# Patient Record
Sex: MALE | Race: WHITE | Employment: FULL TIME | URBAN - METROPOLITAN AREA
[De-identification: names, ages, dates, MRNs, and addresses within clinical notes are randomized per-mention and may not be internally consistent; named-entity substitution may affect disease eponyms.]

---

## 2017-03-20 ENCOUNTER — APPOINTMENT (OUTPATIENT)
Dept: GENERAL RADIOLOGY | Age: 47
End: 2017-03-20
Attending: EMERGENCY MEDICINE
Payer: COMMERCIAL

## 2017-03-20 ENCOUNTER — HOSPITAL ENCOUNTER (EMERGENCY)
Age: 47
Discharge: HOME | End: 2017-03-21
Attending: EMERGENCY MEDICINE
Payer: COMMERCIAL

## 2017-03-20 VITALS
TEMPERATURE: 98 F | WEIGHT: 194 LBS | DIASTOLIC BLOOD PRESSURE: 98 MMHG | HEART RATE: 88 BPM | OXYGEN SATURATION: 100 % | HEIGHT: 66 IN | SYSTOLIC BLOOD PRESSURE: 145 MMHG | RESPIRATION RATE: 20 BRPM | BODY MASS INDEX: 31.18 KG/M2

## 2017-03-20 DIAGNOSIS — J20.9 ACUTE BRONCHITIS, UNSPECIFIED ORGANISM: Primary | ICD-10-CM

## 2017-03-20 PROCEDURE — 94640 AIRWAY INHALATION TREATMENT: CPT

## 2017-03-20 PROCEDURE — 71020 XR CHEST PA + LAT CHEST (CPT=71020): CPT

## 2017-03-20 PROCEDURE — 99284 EMERGENCY DEPT VISIT MOD MDM: CPT

## 2017-03-20 RX ORDER — IPRATROPIUM BROMIDE AND ALBUTEROL SULFATE 2.5; .5 MG/3ML; MG/3ML
3 SOLUTION RESPIRATORY (INHALATION) ONCE
Status: COMPLETED | OUTPATIENT
Start: 2017-03-20 | End: 2017-03-20

## 2017-03-21 RX ORDER — AZITHROMYCIN 250 MG/1
TABLET, FILM COATED ORAL
Qty: 1 PACKAGE | Refills: 0 | Status: SHIPPED | OUTPATIENT
Start: 2017-03-21 | End: 2017-03-26

## 2017-03-21 RX ORDER — PROMETHAZINE HYDROCHLORIDE AND CODEINE PHOSPHATE 6.25; 1 MG/5ML; MG/5ML
5 SYRUP ORAL EVERY 4 HOURS PRN
Qty: 120 ML | Refills: 0 | Status: SHIPPED | OUTPATIENT
Start: 2017-03-21 | End: 2017-04-04 | Stop reason: ALTCHOICE

## 2017-03-21 NOTE — ED PROVIDER NOTES
Patient Seen in: [facility] In [place]    History   Patient presents with:  Cough/URI    Stated Complaint: COUGH FOR PAST 2 MONTHS WANTS TO R/O PNUEMO RIB PAIN FROM COUGHING    HPI    Patient presents with coughing off and on for the 03/20/17 2327 98 °F (36.7 °C)   Temp src 03/20/17 2327 Oral   SpO2 03/20/17 2327 100 %   O2 Device 03/20/17 2327 None (Room air)       Current:/98 mmHg  Pulse 88  Temp(Src) 98 °F (36.7 °C) (Oral)  Resp 20  Ht 167.6 cm (5' 6\")  Wt 87.998 kg  BMI 31. 3

## 2017-04-04 ENCOUNTER — OFFICE VISIT (OUTPATIENT)
Dept: FAMILY MEDICINE CLINIC | Facility: CLINIC | Age: 47
End: 2017-04-04

## 2017-04-04 ENCOUNTER — TELEPHONE (OUTPATIENT)
Dept: FAMILY MEDICINE CLINIC | Facility: CLINIC | Age: 47
End: 2017-04-04

## 2017-04-04 VITALS
TEMPERATURE: 98 F | WEIGHT: 200 LBS | RESPIRATION RATE: 18 BRPM | HEIGHT: 68 IN | SYSTOLIC BLOOD PRESSURE: 126 MMHG | BODY MASS INDEX: 30.31 KG/M2 | OXYGEN SATURATION: 100 % | DIASTOLIC BLOOD PRESSURE: 80 MMHG | HEART RATE: 96 BPM

## 2017-04-04 DIAGNOSIS — N52.8 OTHER MALE ERECTILE DYSFUNCTION: ICD-10-CM

## 2017-04-04 DIAGNOSIS — J40 BRONCHITIS: Primary | ICD-10-CM

## 2017-04-04 PROCEDURE — 99213 OFFICE O/P EST LOW 20 MIN: CPT | Performed by: FAMILY MEDICINE

## 2017-04-04 RX ORDER — VARDENAFIL HYDROCHLORIDE 10 MG/1
5 TABLET ORAL
Qty: 6 TABLET | Refills: 1 | Status: SHIPPED | OUTPATIENT
Start: 2017-04-04 | End: 2018-04-24

## 2017-04-04 NOTE — PROGRESS NOTES
Here in follow-up for bronchitis. He was seen in the urgent care on March 20. Missed work on March 21-23. Natural day office March 24. Was treated with Zithromax and cough suppressant.   I reviewed the office note as well as the chest x-ray with the drew erectile dysfunction, likely psychogenic secondary to new sex partner after 29 years of marriage    Plans FMLA paperwork completed for the days off work March 21-23. Trial of Viagra 50 mg 1/2-1 hour prior to desired sexual activity #6 with 1 refill.   Give

## 2017-04-04 NOTE — PATIENT INSTRUCTIONS
Take 1/2-1 hour prior to desired effect. An erection lasting more than 4 hours should send you to the ER. Sudden loss of vision or hearing should cause you to make an appointment here.   Unlikely a side effect of the medicine but a marker of a vascular di

## 2017-04-11 NOTE — TELEPHONE ENCOUNTER
PA for Levitra has been denied for the following: Must have ED caused by an organic cause. Organic causes are: Diabetes, vascular insufficiency, spinal injuries, or prostatectomy. No formulary alternatives listed. Please advise.

## 2017-04-24 ENCOUNTER — TELEPHONE (OUTPATIENT)
Dept: FAMILY MEDICINE CLINIC | Facility: CLINIC | Age: 47
End: 2017-04-24

## 2017-04-25 ENCOUNTER — TELEPHONE (OUTPATIENT)
Dept: FAMILY MEDICINE CLINIC | Facility: CLINIC | Age: 47
End: 2017-04-25

## 2018-04-01 ENCOUNTER — APPOINTMENT (OUTPATIENT)
Dept: GENERAL RADIOLOGY | Age: 48
End: 2018-04-01
Attending: EMERGENCY MEDICINE
Payer: COMMERCIAL

## 2018-04-01 ENCOUNTER — HOSPITAL ENCOUNTER (EMERGENCY)
Age: 48
Discharge: HOME | End: 2018-04-01
Attending: EMERGENCY MEDICINE
Payer: COMMERCIAL

## 2018-04-01 ENCOUNTER — APPOINTMENT (OUTPATIENT)
Dept: CT IMAGING | Age: 48
End: 2018-04-01
Attending: EMERGENCY MEDICINE
Payer: COMMERCIAL

## 2018-04-01 VITALS
SYSTOLIC BLOOD PRESSURE: 132 MMHG | HEART RATE: 78 BPM | HEIGHT: 67 IN | WEIGHT: 208 LBS | BODY MASS INDEX: 32.65 KG/M2 | TEMPERATURE: 98 F | RESPIRATION RATE: 14 BRPM | OXYGEN SATURATION: 98 % | DIASTOLIC BLOOD PRESSURE: 80 MMHG

## 2018-04-01 DIAGNOSIS — R07.9 CHEST PAIN OF UNCERTAIN ETIOLOGY: Primary | ICD-10-CM

## 2018-04-01 PROCEDURE — 99285 EMERGENCY DEPT VISIT HI MDM: CPT

## 2018-04-01 PROCEDURE — 85025 COMPLETE CBC W/AUTO DIFF WBC: CPT | Performed by: EMERGENCY MEDICINE

## 2018-04-01 PROCEDURE — 93010 ELECTROCARDIOGRAM REPORT: CPT

## 2018-04-01 PROCEDURE — 71275 CT ANGIOGRAPHY CHEST: CPT | Performed by: EMERGENCY MEDICINE

## 2018-04-01 PROCEDURE — 93005 ELECTROCARDIOGRAM TRACING: CPT

## 2018-04-01 PROCEDURE — 96374 THER/PROPH/DIAG INJ IV PUSH: CPT

## 2018-04-01 PROCEDURE — 80053 COMPREHEN METABOLIC PANEL: CPT | Performed by: EMERGENCY MEDICINE

## 2018-04-01 PROCEDURE — 71045 X-RAY EXAM CHEST 1 VIEW: CPT | Performed by: EMERGENCY MEDICINE

## 2018-04-01 PROCEDURE — 84484 ASSAY OF TROPONIN QUANT: CPT | Performed by: EMERGENCY MEDICINE

## 2018-04-01 RX ORDER — KETOROLAC TROMETHAMINE 30 MG/ML
30 INJECTION, SOLUTION INTRAMUSCULAR; INTRAVENOUS ONCE
Status: COMPLETED | OUTPATIENT
Start: 2018-04-01 | End: 2018-04-01

## 2018-04-01 RX ORDER — IBUPROFEN 600 MG/1
600 TABLET ORAL EVERY 8 HOURS PRN
Qty: 30 TABLET | Refills: 0 | Status: SHIPPED | OUTPATIENT
Start: 2018-04-01 | End: 2018-04-11

## 2018-04-01 NOTE — ED INITIAL ASSESSMENT (HPI)
PT REPORTS CENTER CHEST PAIN AT TIMES RADIATES TO LT CHEST SINCE YESTERDAY 1230 PT STATES HAS BEEN CONSTANT SINCE.

## 2018-04-01 NOTE — ED PROVIDER NOTES
Patient Seen in: [facility] In [place]    History   Patient presents with:  Chest Pain Angina (cardiovascular)    Stated Complaint: chest pain since yesterday    HPI    60-year-old male presents emergency department has had some chest carotid bruit  CV: Regular rate and rhythm no murmur rub  Respiratory: Clear to auscultation bilaterally no crackles no wheezes no accessory muscle use  Abdomen: Soft nontender nondistended, no rebound no guarding  no hepatosplenomegaly bowel sounds are pr abnormality identified. Patient's history and emergency room evaluation does not suggest cardiac etiology of his chest discomfort. Supportive care instructions outlined. Discharge with close outpatient follow-up was recommended.   Patient should continue Refills: 0

## 2018-04-02 ENCOUNTER — TELEPHONE (OUTPATIENT)
Dept: FAMILY MEDICINE CLINIC | Facility: CLINIC | Age: 48
End: 2018-04-02

## 2018-04-02 NOTE — TELEPHONE ENCOUNTER
Called patient regarding ER visit for chest pain.   LVM for patient to call us to schedule follow up with

## 2018-04-24 ENCOUNTER — OFFICE VISIT (OUTPATIENT)
Dept: FAMILY MEDICINE CLINIC | Facility: CLINIC | Age: 48
End: 2018-04-24

## 2018-04-24 ENCOUNTER — LAB ENCOUNTER (OUTPATIENT)
Dept: LAB | Age: 48
End: 2018-04-24
Attending: FAMILY MEDICINE
Payer: COMMERCIAL

## 2018-04-24 VITALS
OXYGEN SATURATION: 98 % | DIASTOLIC BLOOD PRESSURE: 98 MMHG | RESPIRATION RATE: 18 BRPM | HEART RATE: 81 BPM | HEIGHT: 68 IN | SYSTOLIC BLOOD PRESSURE: 124 MMHG | BODY MASS INDEX: 32.74 KG/M2 | WEIGHT: 216 LBS

## 2018-04-24 DIAGNOSIS — Z00.00 ROUTINE GENERAL MEDICAL EXAMINATION AT A HEALTH CARE FACILITY: Primary | ICD-10-CM

## 2018-04-24 DIAGNOSIS — Z00.00 BLOOD TESTS FOR ROUTINE GENERAL PHYSICAL EXAMINATION: ICD-10-CM

## 2018-04-24 DIAGNOSIS — N52.9 ERECTILE DYSFUNCTION, UNSPECIFIED ERECTILE DYSFUNCTION TYPE: ICD-10-CM

## 2018-04-24 DIAGNOSIS — N46.9 INFERTILITY MALE: ICD-10-CM

## 2018-04-24 DIAGNOSIS — R74.8 ELEVATED LIVER ENZYMES: ICD-10-CM

## 2018-04-24 DIAGNOSIS — R73.01 IMPAIRED FASTING GLUCOSE: ICD-10-CM

## 2018-04-24 PROCEDURE — 84443 ASSAY THYROID STIM HORMONE: CPT

## 2018-04-24 PROCEDURE — 80061 LIPID PANEL: CPT

## 2018-04-24 PROCEDURE — 80053 COMPREHEN METABOLIC PANEL: CPT

## 2018-04-24 PROCEDURE — 99396 PREV VISIT EST AGE 40-64: CPT | Performed by: FAMILY MEDICINE

## 2018-04-24 PROCEDURE — 36415 COLL VENOUS BLD VENIPUNCTURE: CPT

## 2018-04-24 PROCEDURE — 84403 ASSAY OF TOTAL TESTOSTERONE: CPT

## 2018-04-24 NOTE — PROGRESS NOTES
HPI:  Here for a physical.    PAST MEDICAL HISTORY:  Past Medical History:   Diagnosis Date   • Plantar fasciitis 2/6/2013     PAST SURGICAL HISTORY:  Past Surgical History:  No date: APPENDECTOMY  No date: OTHER SURGICAL HISTORY  MEDICATIONS:    No curren would not cover med due to not being tested for thyroid disease  MUSCULOSKELETAL: knee pain, had scope of right, still pain on left  NEURO: No complaints of any new headaches or change in headache pattern.   PSYCHE: No complaints of symptoms of depression o areas.  EXTREMITIES / MUSCULOSKELETAL: 4 extremities with grossly normal ROM. GIngerly moves due to left knee pain,   No cyanosis, clubbing or edema. NEUROLOGIC: CN's II-XII grossly intact, DTR's 2+/4+ throughout. Strength 5+/5+ x 4 ext.  Alert and Lorriane Hail

## 2018-11-15 ENCOUNTER — TELEPHONE (OUTPATIENT)
Dept: FAMILY MEDICINE CLINIC | Facility: CLINIC | Age: 48
End: 2018-11-15

## 2018-11-15 NOTE — TELEPHONE ENCOUNTER
Renetta, spoke to pt regarding adding herrera Strong to his hmo ins, pt verbalized understanding and he will call the insurance to add his pcp herrera Strong.

## 2018-12-21 ENCOUNTER — HOSPITAL ENCOUNTER (OUTPATIENT)
Age: 48
Discharge: HOME | End: 2018-12-21
Payer: COMMERCIAL

## 2018-12-21 ENCOUNTER — APPOINTMENT (OUTPATIENT)
Dept: ULTRASOUND IMAGING | Age: 48
End: 2018-12-21
Attending: PHYSICIAN ASSISTANT
Payer: COMMERCIAL

## 2018-12-21 VITALS
OXYGEN SATURATION: 96 % | RESPIRATION RATE: 18 BRPM | TEMPERATURE: 99 F | HEART RATE: 99 BPM | SYSTOLIC BLOOD PRESSURE: 141 MMHG | DIASTOLIC BLOOD PRESSURE: 89 MMHG

## 2018-12-21 DIAGNOSIS — N50.82 SCROTAL PAIN: Primary | ICD-10-CM

## 2018-12-21 PROCEDURE — 81002 URINALYSIS NONAUTO W/O SCOPE: CPT | Performed by: PHYSICIAN ASSISTANT

## 2018-12-21 PROCEDURE — 87591 N.GONORRHOEAE DNA AMP PROB: CPT | Performed by: PHYSICIAN ASSISTANT

## 2018-12-21 PROCEDURE — 99214 OFFICE O/P EST MOD 30 MIN: CPT

## 2018-12-21 PROCEDURE — 93975 VASCULAR STUDY: CPT | Performed by: PHYSICIAN ASSISTANT

## 2018-12-21 PROCEDURE — 87491 CHLMYD TRACH DNA AMP PROBE: CPT | Performed by: PHYSICIAN ASSISTANT

## 2018-12-21 PROCEDURE — 76870 US EXAM SCROTUM: CPT | Performed by: PHYSICIAN ASSISTANT

## 2018-12-21 NOTE — ED PROVIDER NOTES
Patient Seen in: [facility]    History   No chief complaint on file.     Stated Complaint: testicle pain    HPI    CHIEF COMPLAINT: Testicular pain     HISTORY OF PRESENT ILLNESS: Patient is a 77-year-old male who presents with c status: Never Smoker      Smokeless tobacco: Never Used      Tobacco comment: two per month    Alcohol use:  Yes      Alcohol/week: 0.0 oz    Drug use: No      Review of Systems    Positive for stated complaint: testicle pain  Other systems are as noted in HISTORY: (As transcribed by Technologist)  Patient states he felt a lump this morning in right testicle. FINDINGS:  TESTES:  Normal appearance with normal arterial and venous flow. No mass or abnormal echogenicity.  EPIDIDYMIS:  4 mm right and 3mm left

## 2018-12-21 NOTE — ED INITIAL ASSESSMENT (HPI)
Right testicle - pain    While taking a shower. Denies any urinary problems. Swelling or redness. Pt states he had similar pain about 2 yrs ago on and off but would go away. pt concerned  Because lump feels hard.

## 2019-05-01 ENCOUNTER — OFFICE VISIT (OUTPATIENT)
Dept: FAMILY MEDICINE CLINIC | Facility: CLINIC | Age: 49
End: 2019-05-01

## 2019-05-01 VITALS
BODY MASS INDEX: 34.44 KG/M2 | HEIGHT: 67.5 IN | RESPIRATION RATE: 18 BRPM | WEIGHT: 222 LBS | SYSTOLIC BLOOD PRESSURE: 138 MMHG | HEART RATE: 94 BPM | OXYGEN SATURATION: 98 % | DIASTOLIC BLOOD PRESSURE: 78 MMHG

## 2019-05-01 DIAGNOSIS — M25.512 ARTHRALGIA OF SHOULDER REGION, LEFT: Primary | ICD-10-CM

## 2019-05-01 PROCEDURE — 99213 OFFICE O/P EST LOW 20 MIN: CPT | Performed by: FAMILY MEDICINE

## 2019-05-01 RX ORDER — NAPROXEN 500 MG/1
500 TABLET ORAL 2 TIMES DAILY
Qty: 28 TABLET | Refills: 0 | Status: SHIPPED | OUTPATIENT
Start: 2019-05-01 | End: 2019-06-12

## 2019-05-01 NOTE — PROGRESS NOTES
Here with several months of left shoulder pain anterior. Limiting his range of motion and affecting his activities of daily living such as showering and getting dressed. No specific injury. No neck pain. No radiation to the neck.   It does radiate into pain.  Wrist good range of motion without pain. Fingers good range of motion 5 out of 5 strength. Neurologic exam reveals positive reflexes and positive light touch.     Assessment left shoulder arthralgia, I suspect osteoarthritis with possibility of a s

## 2019-05-03 ENCOUNTER — HOSPITAL ENCOUNTER (OUTPATIENT)
Dept: GENERAL RADIOLOGY | Age: 49
Discharge: HOME | End: 2019-05-03
Attending: FAMILY MEDICINE
Payer: COMMERCIAL

## 2019-05-03 DIAGNOSIS — M25.512 ARTHRALGIA OF SHOULDER REGION, LEFT: ICD-10-CM

## 2019-05-03 PROCEDURE — 73030 X-RAY EXAM OF SHOULDER: CPT | Performed by: FAMILY MEDICINE

## 2019-06-12 ENCOUNTER — OFFICE VISIT (OUTPATIENT)
Dept: FAMILY MEDICINE CLINIC | Facility: CLINIC | Age: 49
End: 2019-06-12

## 2019-06-12 VITALS
SYSTOLIC BLOOD PRESSURE: 120 MMHG | HEIGHT: 67.5 IN | BODY MASS INDEX: 33.04 KG/M2 | HEART RATE: 84 BPM | OXYGEN SATURATION: 95 % | DIASTOLIC BLOOD PRESSURE: 88 MMHG | RESPIRATION RATE: 18 BRPM | TEMPERATURE: 98 F | WEIGHT: 213 LBS

## 2019-06-12 DIAGNOSIS — H57.9 SENSATION OF FOREIGN BODY IN EYE: ICD-10-CM

## 2019-06-12 DIAGNOSIS — H10.31 ACUTE BACTERIAL CONJUNCTIVITIS OF RIGHT EYE: Primary | ICD-10-CM

## 2019-06-12 PROCEDURE — 99213 OFFICE O/P EST LOW 20 MIN: CPT | Performed by: PHYSICIAN ASSISTANT

## 2019-06-12 RX ORDER — OFLOXACIN 3 MG/ML
SOLUTION/ DROPS OPHTHALMIC
Qty: 1 BOTTLE | Refills: 0 | Status: SHIPPED | OUTPATIENT
Start: 2019-06-12 | End: 2019-12-04

## 2019-06-12 NOTE — PROGRESS NOTES
CHIEF COMPLAINT:   Patient presents with:  Eye Problem: right eye drainage,dryness mild tearing,redness s/s for 3 days.   No OTC meds used      HPI:   Maximilianotyesha Blackman. is a 50year old male who presents with chief complaint of right eye redness and pain f EYES:denies blurred vision or double vision.  See HPI  HENT: denies ear pain, congestion, sore throat  LUNGS: denies shortness of breath or cough  CARDIOVASCULAR: denies chest pain or palpitations   GI: denies N/V/C or abdominal pain  NEURO: denies headache Advised patient to avoid touching eyes. Stressed importance of good handwashing as conjunctivitis is very contagious. Warm compresses to affected eye prn. Can return to work/school after on medication for 24 hours. Patient Instructions     1.  Ofloxac · Failure of normal vision to return within 24 to 48 hours  Date Last Reviewed: 7/1/2017  © 0396-9533 [employer]. [address] [facility], [address] [place] [address]. All rights reserved.  This information is not intended as a substitute for profession

## 2019-06-12 NOTE — PATIENT INSTRUCTIONS
1. Ofloxacin  2. Follow up with ophthalmology ASAP  3. If worsening symptoms to [facility] or ER  4. Stop contact use until symptoms resolve    Conjunctivitis, Nonspecific    The membrane that covers the white part of your eye (the conjunctiva) is inflamed.  Inf © 9838-8909 [employer]. [address] [facility] [place], [facility] [employer] [place]. All rights reserved. This information is not intended as a substitute for professional medical care. Always follow your healthcare professional's instructions.

## 2019-12-04 ENCOUNTER — OFFICE VISIT (OUTPATIENT)
Dept: FAMILY MEDICINE CLINIC | Facility: CLINIC | Age: 49
End: 2019-12-04

## 2019-12-04 VITALS
WEIGHT: 229 LBS | SYSTOLIC BLOOD PRESSURE: 130 MMHG | HEIGHT: 67.5 IN | TEMPERATURE: 99 F | DIASTOLIC BLOOD PRESSURE: 88 MMHG | RESPIRATION RATE: 18 BRPM | BODY MASS INDEX: 35.52 KG/M2 | HEART RATE: 93 BPM | OXYGEN SATURATION: 98 %

## 2019-12-04 DIAGNOSIS — J06.9 VIRAL URI WITH COUGH: Primary | ICD-10-CM

## 2019-12-04 PROCEDURE — 99213 OFFICE O/P EST LOW 20 MIN: CPT | Performed by: NURSE PRACTITIONER

## 2019-12-04 RX ORDER — ALBUTEROL SULFATE 90 UG/1
2 AEROSOL, METERED RESPIRATORY (INHALATION) EVERY 4 HOURS PRN
Qty: 1 INHALER | Refills: 0 | Status: SHIPPED | OUTPATIENT
Start: 2019-12-04 | End: 2019-12-18

## 2019-12-04 RX ORDER — BENZONATATE 200 MG/1
200 CAPSULE ORAL 3 TIMES DAILY PRN
Qty: 20 CAPSULE | Refills: 0 | Status: SHIPPED | OUTPATIENT
Start: 2019-12-04 | End: 2019-12-11

## 2019-12-04 NOTE — PROGRESS NOTES
CHIEF COMPLAINT:   Patient presents with:  Cough: dry, with chest discomfort  Headache: s/s for 3 days. OTC meds taken      HPI:   Annalisa Muniz. is a 52year old male who presents for upper respiratory symptoms for  3 days.  Patient reports congestion THROAT: Oral mucosa pink, moist. Posterior pharynx is not erythematous. no exudates. Tonsils 2/4. NECK: Supple, non-tender  LUNGS: clear to auscultation bilaterally, no wheezes or rhonchi. Breathing is non labored.   CARDIO: RRR without murmur  EXTREMITI You have a viral upper respiratory illness (URI), which is another term for the common cold. This illness is contagious during the first few days. It is spread through the air by coughing and sneezing.  It may also be spread by direct contact (touching the · Over-the-counter cold medicines will not shorten the length of time you’re sick, but they may be helpful for the following symptoms: cough, sore throat, and nasal and sinus congestion.  If you take prescription medicines, ask your healthcare provider or p 5. Place the inhaler in your mouth and close your lips tightly around it. (Or hold the inhaler 1 to 2 inches from your open mouth if told to do so by your healthcare provider.)  6. Squeeze the inhaler as you breathe in slowly through your mouth.  Do this un A steroid inhaler is used to prevent an asthma attack. Don't use this to treat an acute wheezing episode. Use only a bronchodilator inhaler (quick relief) to treat an acute asthma attack.   If your medicine is not working and you need to use it more often t

## 2019-12-04 NOTE — PATIENT INSTRUCTIONS
Humidifier in room  Sleep propped  Push fluids  Limit dairy  Mucinex as directed  Benadryl at night as needed    Viral Upper Respiratory Illness (Adult)    You have a viral upper respiratory illness (URI), which is another term for the common cold.  This · Over-the-counter cold medicines will not shorten the length of time you’re sick, but they may be helpful for the following symptoms: cough, sore throat, and nasal and sinus congestion.  If you take prescription medicines, ask your healthcare provider or p 5. Place the inhaler in your mouth and close your lips tightly around it. (Or hold the inhaler 1 to 2 inches from your open mouth if told to do so by your healthcare provider.)  6. Squeeze the inhaler as you breathe in slowly through your mouth.  Do this un A steroid inhaler is used to prevent an asthma attack. Don't use this to treat an acute wheezing episode. Use only a bronchodilator inhaler (quick relief) to treat an acute asthma attack.   If your medicine is not working and you need to use it more often t

## 2020-11-23 ENCOUNTER — OFFICE VISIT (OUTPATIENT)
Dept: FAMILY MEDICINE CLINIC | Facility: CLINIC | Age: 50
End: 2020-11-23

## 2020-11-23 VITALS
HEART RATE: 91 BPM | OXYGEN SATURATION: 98 % | RESPIRATION RATE: 18 BRPM | DIASTOLIC BLOOD PRESSURE: 78 MMHG | HEIGHT: 67.5 IN | BODY MASS INDEX: 34.44 KG/M2 | SYSTOLIC BLOOD PRESSURE: 118 MMHG | WEIGHT: 222 LBS

## 2020-11-23 DIAGNOSIS — L72.3 SEBACEOUS CYST: Primary | ICD-10-CM

## 2020-11-23 PROCEDURE — 99213 OFFICE O/P EST LOW 20 MIN: CPT | Performed by: FAMILY MEDICINE

## 2020-11-23 PROCEDURE — 3078F DIAST BP <80 MM HG: CPT | Performed by: FAMILY MEDICINE

## 2020-11-23 PROCEDURE — 3008F BODY MASS INDEX DOCD: CPT | Performed by: FAMILY MEDICINE

## 2020-11-23 PROCEDURE — 3074F SYST BP LT 130 MM HG: CPT | Performed by: FAMILY MEDICINE

## 2020-11-23 NOTE — PROGRESS NOTES
Lesion present to the left axilla going on 6 months. Just happened and noticed it. No itching or bleeding. No pain or discharge. Seems of gotten slightly bigger over the 6 months. No fevers chills or night sweats.   He has tingling down the arm but thi

## 2020-11-23 NOTE — PATIENT INSTRUCTIONS
Epidermoid Cyst, No Infection  An epidermoid cyst is a small abnormal growth in the top layers of the skin. It's filled with keratin, the same proteins that make up your hair and nails.  An epidermoid cyst may incorrectly be called a sebaceous cyst.   Roly Epidermoid cysts often go away without any treatment. If your cyst doesn’t go away, and it bothers you, it may be drained or removed. If the cyst drains on its own, it may return. Resist the temptation to squeeze, pop, stick a needle in it, or cut it open.

## 2020-11-30 ENCOUNTER — APPOINTMENT (OUTPATIENT)
Dept: LAB | Facility: HOSPITAL | Age: 50
End: 2020-11-30
Attending: FAMILY MEDICINE
Payer: COMMERCIAL

## 2020-11-30 DIAGNOSIS — Z20.822 ENCOUNTER FOR SCREENING LABORATORY TESTING FOR COVID-19 VIRUS: ICD-10-CM

## 2021-02-27 ENCOUNTER — APPOINTMENT (OUTPATIENT)
Dept: CT IMAGING | Age: 51
End: 2021-02-27
Attending: FAMILY MEDICINE

## 2021-02-27 ENCOUNTER — HOSPITAL ENCOUNTER (EMERGENCY)
Age: 51
Discharge: HOME | End: 2021-02-27
Attending: FAMILY MEDICINE

## 2021-02-27 VITALS
SYSTOLIC BLOOD PRESSURE: 156 MMHG | RESPIRATION RATE: 18 BRPM | DIASTOLIC BLOOD PRESSURE: 107 MMHG | HEART RATE: 105 BPM | TEMPERATURE: 98.1 F | OXYGEN SATURATION: 97 %
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
## 2021-02-27 DIAGNOSIS — M54.50 ACUTE BILATERAL LOW BACK PAIN WITHOUT SCIATICA: Primary | ICD-10-CM

## 2021-02-27 PROCEDURE — 99284 EMERGENCY DEPT VISIT MOD MDM: CPT

## 2021-02-27 PROCEDURE — 72131 CT LUMBAR SPINE W/O DYE: CPT

## 2021-02-27 PROCEDURE — 99283 EMERGENCY DEPT VISIT LOW MDM: CPT | Performed by: FAMILY MEDICINE

## 2021-02-27 PROCEDURE — 70450 CT HEAD/BRAIN W/O DYE: CPT

## 2021-02-27 PROCEDURE — 10002803 HB RX 637: Performed by: FAMILY MEDICINE

## 2021-02-27 RX ORDER — BACLOFEN 5 MG/1
5 TABLET ORAL 3 TIMES DAILY
Qty: 15 TABLET | Refills: 0 | Status: SHIPPED | OUTPATIENT
Start: 2021-02-27 | End: 2021-03-04

## 2021-02-27 RX ORDER — ACETAMINOPHEN 500 MG
1000 TABLET ORAL ONCE
Status: DISCONTINUED | OUTPATIENT
Start: 2021-02-27 | End: 2021-02-27

## 2021-02-27 RX ORDER — TRAMADOL HYDROCHLORIDE 50 MG/1
50 TABLET ORAL EVERY 6 HOURS PRN
Qty: 20 TABLET | Refills: 0 | Status: SHIPPED | OUTPATIENT
Start: 2021-02-27 | End: 2021-03-04

## 2021-02-27 RX ORDER — NAPROXEN 500 MG/1
500 TABLET ORAL 2 TIMES DAILY WITH MEALS
Qty: 14 TABLET | Refills: 0 | Status: SHIPPED | OUTPATIENT
Start: 2021-02-27 | End: 2021-03-06

## 2021-02-27 RX ORDER — HYDROCODONE BITARTRATE AND ACETAMINOPHEN 5; 325 MG/1; MG/1
1 TABLET ORAL ONCE
Status: COMPLETED | OUTPATIENT
Start: 2021-02-27 | End: 2021-02-27

## 2021-02-27 RX ADMIN — HYDROCODONE BITARTRATE AND ACETAMINOPHEN 1 TABLET: 5; 325 TABLET ORAL at 23:00

## 2021-02-27 ASSESSMENT — ENCOUNTER SYMPTOMS
TROUBLE SWALLOWING: 0
DIAPHORESIS: 0
ALLERGIC/IMMUNOLOGIC NEGATIVE: 1
EYES NEGATIVE: 1
VOMITING: 0
WEAKNESS: 0
PSYCHIATRIC NEGATIVE: 1
PHOTOPHOBIA: 0
NAUSEA: 0
BACK PAIN: 1
CHEST TIGHTNESS: 0
ABDOMINAL PAIN: 0
STRIDOR: 0
CONFUSION: 0
HEADACHES: 0
RESPIRATORY NEGATIVE: 1
VOICE CHANGE: 0
NEUROLOGICAL NEGATIVE: 1
HEMATOLOGIC/LYMPHATIC NEGATIVE: 1
ENDOCRINE NEGATIVE: 1
HALLUCINATIONS: 0
SPEECH DIFFICULTY: 0
CONSTITUTIONAL NEGATIVE: 1
DIZZINESS: 0
WHEEZING: 0
LIGHT-HEADEDNESS: 0
APNEA: 0
FEVER: 0
SHORTNESS OF BREATH: 0
GASTROINTESTINAL NEGATIVE: 1

## 2021-02-27 ASSESSMENT — PAIN SCALES - GENERAL
PAINLEVEL_OUTOF10: 7
PAINLEVEL_OUTOF10: 5

## 2021-03-02 ENCOUNTER — OFFICE VISIT (OUTPATIENT)
Dept: FAMILY MEDICINE CLINIC | Facility: CLINIC | Age: 51
End: 2021-03-02

## 2021-03-02 VITALS
SYSTOLIC BLOOD PRESSURE: 138 MMHG | OXYGEN SATURATION: 98 % | RESPIRATION RATE: 18 BRPM | HEART RATE: 82 BPM | BODY MASS INDEX: 35.72 KG/M2 | WEIGHT: 217 LBS | DIASTOLIC BLOOD PRESSURE: 100 MMHG | HEIGHT: 65.5 IN

## 2021-03-02 DIAGNOSIS — S49.91XA TRAUMATIC INJURY OF SHOULDER, RIGHT, INITIAL ENCOUNTER: ICD-10-CM

## 2021-03-02 DIAGNOSIS — S30.0XXA CONTUSION OF BUTTOCK, INITIAL ENCOUNTER: ICD-10-CM

## 2021-03-02 DIAGNOSIS — G44.209 ACUTE NON INTRACTABLE TENSION-TYPE HEADACHE: Primary | ICD-10-CM

## 2021-03-02 DIAGNOSIS — M54.50 ACUTE MIDLINE LOW BACK PAIN WITHOUT SCIATICA: ICD-10-CM

## 2021-03-02 DIAGNOSIS — R20.0 NUMBNESS OF FINGERS: ICD-10-CM

## 2021-03-02 DIAGNOSIS — M51.26 LUMBAR HERNIATED DISC: ICD-10-CM

## 2021-03-02 DIAGNOSIS — S80.12XA CONTUSION OF LEFT CALF, INITIAL ENCOUNTER: ICD-10-CM

## 2021-03-02 DIAGNOSIS — S80.211A ABRASION, KNEE, RIGHT, INITIAL ENCOUNTER: ICD-10-CM

## 2021-03-02 PROCEDURE — 3075F SYST BP GE 130 - 139MM HG: CPT | Performed by: FAMILY MEDICINE

## 2021-03-02 PROCEDURE — 3080F DIAST BP >= 90 MM HG: CPT | Performed by: FAMILY MEDICINE

## 2021-03-02 PROCEDURE — 3008F BODY MASS INDEX DOCD: CPT | Performed by: FAMILY MEDICINE

## 2021-03-02 PROCEDURE — 99214 OFFICE O/P EST MOD 30 MIN: CPT | Performed by: FAMILY MEDICINE

## 2021-03-02 RX ORDER — TRAMADOL HYDROCHLORIDE 50 MG/1
50 TABLET ORAL
COMMUNITY
Start: 2021-02-27 | End: 2021-03-04

## 2021-03-02 RX ORDER — NAPROXEN 500 MG/1
TABLET ORAL
COMMUNITY
Start: 2021-02-27 | End: 2021-05-14

## 2021-03-02 RX ORDER — SILDENAFIL CITRATE 20 MG/1
TABLET ORAL
COMMUNITY
Start: 2020-12-11
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
## 2021-03-03 ENCOUNTER — HOSPITAL ENCOUNTER (OUTPATIENT)
Dept: GENERAL RADIOLOGY | Facility: HOSPITAL | Age: 51
Discharge: HOME | End: 2021-03-03
Attending: FAMILY MEDICINE
Payer: COMMERCIAL

## 2021-03-03 ENCOUNTER — HOSPITAL ENCOUNTER (OUTPATIENT)
Dept: MRI IMAGING | Facility: HOSPITAL | Age: 51
Discharge: HOME | End: 2021-03-03
Attending: FAMILY MEDICINE
Payer: COMMERCIAL

## 2021-03-03 DIAGNOSIS — R20.0 NUMBNESS OF FINGERS: ICD-10-CM

## 2021-03-03 DIAGNOSIS — Z13.89 ENCOUNTER FOR IMAGING TO SCREEN FOR METAL PRIOR TO MRI: ICD-10-CM

## 2021-03-03 DIAGNOSIS — G44.209 ACUTE NON INTRACTABLE TENSION-TYPE HEADACHE: ICD-10-CM

## 2021-03-03 PROCEDURE — 70030 X-RAY EYE FOR FOREIGN BODY: CPT | Performed by: FAMILY MEDICINE

## 2021-03-03 PROCEDURE — 72141 MRI NECK SPINE W/O DYE: CPT | Performed by: FAMILY MEDICINE

## 2021-03-04 PROBLEM — M50.20 HERNIATED DISC, CERVICAL: Status: ACTIVE | Noted: 2021-03-04

## 2021-03-09 ENCOUNTER — OFFICE VISIT (OUTPATIENT)
Dept: FAMILY MEDICINE CLINIC | Facility: CLINIC | Age: 51
End: 2021-03-09

## 2021-03-09 VITALS
DIASTOLIC BLOOD PRESSURE: 90 MMHG | SYSTOLIC BLOOD PRESSURE: 140 MMHG | RESPIRATION RATE: 18 BRPM | HEART RATE: 98 BPM | OXYGEN SATURATION: 98 %

## 2021-03-09 DIAGNOSIS — M50.20 HERNIATED DISC, CERVICAL: ICD-10-CM

## 2021-03-09 DIAGNOSIS — M54.50 ACUTE MIDLINE LOW BACK PAIN WITHOUT SCIATICA: ICD-10-CM

## 2021-03-09 DIAGNOSIS — G44.209 ACUTE NON INTRACTABLE TENSION-TYPE HEADACHE: Primary | ICD-10-CM

## 2021-03-09 PROCEDURE — 3077F SYST BP >= 140 MM HG: CPT | Performed by: FAMILY MEDICINE

## 2021-03-09 PROCEDURE — 99214 OFFICE O/P EST MOD 30 MIN: CPT | Performed by: FAMILY MEDICINE

## 2021-03-09 PROCEDURE — 3080F DIAST BP >= 90 MM HG: CPT | Performed by: FAMILY MEDICINE

## 2021-03-09 RX ORDER — TRAMADOL HYDROCHLORIDE 50 MG/1
50 TABLET ORAL EVERY 6 HOURS PRN
Qty: 30 TABLET | Refills: 1 | Status: SHIPPED | OUTPATIENT
Start: 2021-03-09 | End: 2021-05-14

## 2021-03-09 RX ORDER — BACLOFEN 5 MG/1
5 TABLET ORAL 3 TIMES DAILY
Qty: 90 TABLET | Refills: 0 | Status: SHIPPED | OUTPATIENT
Start: 2021-03-09 | End: 2021-04-08
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
## 2021-03-10 ENCOUNTER — PATIENT MESSAGE (OUTPATIENT)
Dept: FAMILY MEDICINE CLINIC | Facility: CLINIC | Age: 51
End: 2021-03-10
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
## 2021-03-12 ENCOUNTER — TELEPHONE (OUTPATIENT)
Dept: FAMILY MEDICINE CLINIC | Facility: CLINIC | Age: 51
End: 2021-03-12

## 2021-03-12 DIAGNOSIS — M54.50 ACUTE BILATERAL LOW BACK PAIN, UNSPECIFIED WHETHER SCIATICA PRESENT: Primary | ICD-10-CM
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
## 2021-03-12 NOTE — TELEPHONE ENCOUNTER
[address] [address] [address] Clinical Staff  Cc: [facility] Referral Pool  Phone Number: [phone]   . Reason for the order/referral:Orthopedic surgeon/CONSULT AND TREATMENT   PCP: Loly Dunham   Refer to Provider Katiana Littlejohn   Specialty:Orthopedic s

## 2021-03-18 ENCOUNTER — PATIENT MESSAGE (OUTPATIENT)
Dept: FAMILY MEDICINE CLINIC | Facility: CLINIC | Age: 51
End: 2021-03-18

## 2021-03-18 NOTE — TELEPHONE ENCOUNTER
From: Mateo Carl. To: Colletta Silos, MD  Sent: 3/18/2021 11:39 AM CDT  Subject: Non-Urgent Medical Question    Dr. Yunior Guerra, I am feeling 80% better and I would like to be released back to work fully without restrictions.  I will be contacting my Adalgisa Olguin

## 2021-07-13 ENCOUNTER — OFFICE VISIT (OUTPATIENT)
Dept: FAMILY MEDICINE CLINIC | Facility: CLINIC | Age: 51
End: 2021-07-13

## 2021-07-13 DIAGNOSIS — M54.2 CERVICALGIA: ICD-10-CM

## 2021-07-13 DIAGNOSIS — M48.02 NEURAL FORAMINAL STENOSIS OF CERVICAL SPINE: Primary | ICD-10-CM

## 2021-07-13 DIAGNOSIS — G89.29 CHRONIC BILATERAL THORACIC BACK PAIN: ICD-10-CM

## 2021-07-13 DIAGNOSIS — M54.6 CHRONIC BILATERAL THORACIC BACK PAIN: ICD-10-CM

## 2021-07-13 PROCEDURE — 99213 OFFICE O/P EST LOW 20 MIN: CPT | Performed by: INTERNAL MEDICINE

## 2021-07-13 PROCEDURE — 3079F DIAST BP 80-89 MM HG: CPT | Performed by: INTERNAL MEDICINE

## 2021-07-13 PROCEDURE — 3008F BODY MASS INDEX DOCD: CPT | Performed by: INTERNAL MEDICINE

## 2021-07-13 PROCEDURE — 3075F SYST BP GE 130 - 139MM HG: CPT | Performed by: INTERNAL MEDICINE

## 2021-07-13 RX ORDER — BICTEGRAVIR SODIUM, EMTRICITABINE, AND TENOFOVIR ALAFENAMIDE FUMARATE 50; 200; 25 MG/1; MG/1; MG/1
TABLET ORAL
COMMUNITY
Start: 2021-05-14 | End: 2021-07-13

## 2021-07-13 RX ORDER — BACLOFEN 5 MG/1
5 TABLET ORAL
COMMUNITY
Start: 2021-02-27 | End: 2021-07-13
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
## 2021-07-21 ENCOUNTER — TELEPHONE (OUTPATIENT)
Dept: FAMILY MEDICINE CLINIC | Facility: CLINIC | Age: 51
End: 2021-07-21

## 2021-07-21 VITALS
SYSTOLIC BLOOD PRESSURE: 138 MMHG | BODY MASS INDEX: 35.16 KG/M2 | HEART RATE: 102 BPM | TEMPERATURE: 98 F | OXYGEN SATURATION: 98 % | RESPIRATION RATE: 20 BRPM | DIASTOLIC BLOOD PRESSURE: 88 MMHG | HEIGHT: 67 IN | WEIGHT: 224 LBS

## 2021-07-21 DIAGNOSIS — M54.2 CERVICALGIA: Primary | ICD-10-CM

## 2021-07-21 DIAGNOSIS — M54.6 THORACIC SPINE PAIN: ICD-10-CM

## 2021-07-22 NOTE — TELEPHONE ENCOUNTER
Please place a referral for follow up with ortho Dr. Flower Paulino. Let pt know his referral is ready.   Cervicalgia, thoracic pain

## 2021-08-11 PROBLEM — M48.02 FORAMINAL STENOSIS OF CERVICAL REGION: Status: ACTIVE | Noted: 2021-08-11

## 2023-02-24 ENCOUNTER — HOSPITAL ENCOUNTER (OUTPATIENT)
Age: 53
Discharge: HOME | End: 2023-02-24
Payer: COMMERCIAL

## 2023-02-24 VITALS
RESPIRATION RATE: 18 BRPM | SYSTOLIC BLOOD PRESSURE: 142 MMHG | HEART RATE: 100 BPM | OXYGEN SATURATION: 95 % | DIASTOLIC BLOOD PRESSURE: 98 MMHG | TEMPERATURE: 100 F

## 2023-02-24 DIAGNOSIS — U07.1 COVID-19: Primary | ICD-10-CM

## 2023-02-24 PROCEDURE — 99203 OFFICE O/P NEW LOW 30 MIN: CPT

## 2023-02-24 PROCEDURE — 99213 OFFICE O/P EST LOW 20 MIN: CPT

## 2023-02-24 RX ORDER — NIRMATRELVIR AND RITONAVIR 300-100 MG
KIT ORAL
Qty: 30 TABLET | Refills: 0 | Status: SHIPPED | OUTPATIENT
Start: 2023-02-24 | End: 2023-03-01

## 2023-02-24 NOTE — DISCHARGE INSTRUCTIONS
You tested positive for COVID-19 today. You should remain in self isolation for 5 days from symptom onset, or until you are fever free for 24 hours, whichever is longer. Wear a well fitting mask for 5 additional days after your initial self-isolation, when you are in close contact with other people. Treat your symptoms as needed with over-the-counter medication such as cold medicine, ibuprofen, or Tylenol. If you develop chest pain, shortness or breath or believe your symptoms to require medical intervention for any reason, please go directly to the emergency department.

## 2023-02-24 NOTE — ED INITIAL ASSESSMENT (HPI)
Cold symptoms started Monday. Pt states he tested positive  covid 2x from a home covid test, went to [facility] for test but has not received the result yet.  Today  +covid test. Pt requesting Paxlovid

## 2024-05-14 ENCOUNTER — OFFICE VISIT (OUTPATIENT)
Dept: FAMILY MEDICINE CLINIC | Facility: CLINIC | Age: 54
End: 2024-05-14

## 2024-05-14 VITALS
OXYGEN SATURATION: 96 % | WEIGHT: 194 LBS | BODY MASS INDEX: 30.45 KG/M2 | HEIGHT: 67 IN | RESPIRATION RATE: 16 BRPM | DIASTOLIC BLOOD PRESSURE: 88 MMHG | SYSTOLIC BLOOD PRESSURE: 138 MMHG | HEART RATE: 99 BPM

## 2024-05-14 DIAGNOSIS — M25.561 LATERAL KNEE PAIN, RIGHT: ICD-10-CM

## 2024-05-14 DIAGNOSIS — M75.51 BURSITIS OF RIGHT SHOULDER: ICD-10-CM

## 2024-05-14 PROCEDURE — 3075F SYST BP GE 130 - 139MM HG: CPT | Performed by: FAMILY MEDICINE

## 2024-05-14 PROCEDURE — 3079F DIAST BP 80-89 MM HG: CPT | Performed by: FAMILY MEDICINE

## 2024-05-14 PROCEDURE — 99213 OFFICE O/P EST LOW 20 MIN: CPT | Performed by: FAMILY MEDICINE

## 2024-05-14 PROCEDURE — 3008F BODY MASS INDEX DOCD: CPT | Performed by: FAMILY MEDICINE

## 2024-05-14 RX ORDER — NABUMETONE 500 MG/1
500 TABLET, FILM COATED ORAL 2 TIMES DAILY
Qty: 28 TABLET | Refills: 0 | Status: SHIPPED | OUTPATIENT
Start: 2024-05-14

## 2024-05-14 NOTE — PROGRESS NOTES
Here with complaints of shoulder pain over the crown of the right shoulder.  No redness or swelling noted.  Does not radiate down the arm.  He has a history of neck herniated disc but did not require surgery.    Knee pain also on the right.  He was a  in his younger years so there is lots of wear and tear on the knees.  No acute injury now.  It was swollen although cleared up overnight.  No redness or warmth.  There is been pain proximal and distal to the knee.  No locking.  Sometimes feels like it could give out.    PAST MEDICAL HISTORY:  Past Medical History:    Plantar fasciitis     PAST SURGICAL HISTORY:  Past Surgical History:   Procedure Laterality Date    Appendectomy       MEDICATIONS:  Current Outpatient Medications   Medication Sig Dispense Refill    nabumetone 500 MG Oral Tab Take 1 tablet (500 mg total) by mouth 2 (two) times daily. 28 tablet 0    Sildenafil Citrate 20 MG Oral Tab        ALLERGIES:   Patient has no known allergies.  FAMILY HISTORY  Family History   Problem Relation Age of Onset    Diabetes Mother     Asthma Mother     Cancer Father         melanoma    Diabetes Father     Asthma Father     Asthma Sister     Diabetes Maternal Grandmother     Other (cholecystectomy) Son 23    Cancer Paternal Uncle         liver, agent orange    Cancer Paternal Uncle         blood cancer, agent orange    Heart Disease Neg     Stroke Neg        PHYSICAL EXAM:  /88   Pulse 99   Resp 16   Ht 5' 7\" (1.702 m)   Wt 194 lb (88 kg)   SpO2 96%   BMI 30.38 kg/m²     Shoulders with full range of motion.  Abduction increases the pain in the right shoulder.  He is tender just distal to the bony prominence of the shoulder.  AC joint is nontender.  Shoulder blade is nontender.  Elbow good range of motion without pain.  Knees with good range of motion.  Minimal crepitus on the left none on the right.  Patellar grind is negative.  Joint lines are nontender.  Testing of the ligaments revealed pain with  varus pressure over the lateral collateral ligament.    ASSESSMENT/PLAN:    1. Bursitis of right shoulder    - nabumetone 500 MG Oral Tab; Take 1 tablet (500 mg total) by mouth 2 (two) times daily.  Dispense: 28 tablet; Refill: 0    2. Lateral knee pain, right  If this does not respond to the Relafen as well, may need MRI to assess for lateral collateral ligament tear.  - XR KNEE (3 VIEWS), RIGHT (CPT=73562); Future         If this note is coded by time based on the Office/Outpatient Evaluation and Management Codes effective January 1, 2021, the time includes reviewing the chart before entering the exam room, the time spent with the patient in face to face discussion and examination, and the time documenting the visit.  It may also include time ordering tests or reviewing test results completed on the same day.

## 2024-05-22 ENCOUNTER — HOSPITAL ENCOUNTER (OUTPATIENT)
Dept: GENERAL RADIOLOGY | Age: 54
Discharge: HOME | End: 2024-05-22
Attending: FAMILY MEDICINE

## 2024-05-22 DIAGNOSIS — M25.561 LATERAL KNEE PAIN, RIGHT: ICD-10-CM

## 2024-05-22 PROCEDURE — 73562 X-RAY EXAM OF KNEE 3: CPT | Performed by: FAMILY MEDICINE

## 2024-07-02 ENCOUNTER — HOSPITAL ENCOUNTER (OUTPATIENT)
Age: 54
Discharge: HOME | End: 2024-07-02
Attending: EMERGENCY MEDICINE
Payer: COMMERCIAL

## 2024-07-02 VITALS
TEMPERATURE: 97 F | SYSTOLIC BLOOD PRESSURE: 156 MMHG | HEIGHT: 67 IN | HEART RATE: 82 BPM | OXYGEN SATURATION: 97 % | BODY MASS INDEX: 30.76 KG/M2 | RESPIRATION RATE: 16 BRPM | DIASTOLIC BLOOD PRESSURE: 94 MMHG | WEIGHT: 196 LBS

## 2024-07-02 DIAGNOSIS — R29.898 BILATERAL ARM WEAKNESS: Primary | ICD-10-CM

## 2024-07-02 DIAGNOSIS — M25.50 ARTHRALGIA, UNSPECIFIED JOINT: ICD-10-CM

## 2024-07-02 PROCEDURE — 99214 OFFICE O/P EST MOD 30 MIN: CPT

## 2024-07-02 PROCEDURE — 99213 OFFICE O/P EST LOW 20 MIN: CPT

## 2024-07-02 RX ORDER — METHYLPREDNISOLONE 4 MG/1
TABLET ORAL
Qty: 1 EACH | Refills: 0 | Status: SHIPPED | OUTPATIENT
Start: 2024-07-02

## 2024-07-02 NOTE — DISCHARGE INSTRUCTIONS
Follow-up with your primary care doctor in [facility] for further evaluation of your arm weakness  Take Medrol Dosepak to help with joint pain and swelling  Return if any worsening symptoms or new concerns

## 2024-07-02 NOTE — ED INITIAL ASSESSMENT (HPI)
Patient reports hand stiffness for about 2 months.  Patient reports the left hand is worse than the left.  Patient reports numbness and tingling especially at night.

## 2024-07-02 NOTE — ED PROVIDER NOTES
Patient Seen in: [facility]      History     Chief Complaint   Patient presents with    Hand Pain     Stated Complaint: hand stiffness/numbness/swelling/ weakness    Subjective:   HPI    53-year-old male presents to the immediate care with complaints of numbness and tingling and weakness to both arms.  He states that this has been ongoing for the last 2 months.  He denies having any neck pain.  Denies any lower extremity weakness.  Denies any bowel or bladder incontinence.  Patient also complains of intermittent joint pain and swelling to his left hands.  He complains of joint pain to his left thumb second and third MCP joints.  Patient denies any facial droop or slurred speech.    Objective:   Past Medical History:    Plantar fasciitis              Past Surgical History:   Procedure Laterality Date    Appendectomy                  Social History     Socioeconomic History    Marital status:    Tobacco Use    Smoking status: Never    Smokeless tobacco: Never    Tobacco comments:     two per month   Vaping Use    Vaping status: Never Used   Substance and Sexual Activity    Alcohol use: Yes     Alcohol/week: 0.0 standard drinks of alcohol   Other Topics Concern    Caffeine Concern Yes    Exercise No              Review of Systems    Positive for stated Chief Complaint: Hand Pain    Other systems are as noted in HPI.  Constitutional and vital signs reviewed.      All other systems reviewed and negative except as noted above.    Physical Exam     ED Triage Vitals [07/02/24 1126]   BP (!) 156/94   Pulse 82   Resp 16   Temp 97.4 °F (36.3 °C)   Temp src Temporal   SpO2 97 %   O2 Device None (Room air)       Current Vitals:   Vital Signs  BP: (!) 156/94  Pulse: 82  Resp: 16  Temp: 97.4 °F (36.3 °C)  Temp src: Temporal    Oxygen Therapy  SpO2: 97 %  O2 Device: None (Room air)            Physical Exam    General: Alert and oriented. No acute distress.  HEENT: Normocephalic. No evidence of trauma.  Extraocular movements are intact.  Cardiovascular exam: Regular rate and rhythm  Lungs: Clear to auscultation bilaterally.  Abdomen: Soft, nondistended, nontender.  Extremities: No evidence of deformity. No clubbing or cyanosis.    Neuro: No focal deficit is noted..Motor strength is 4+ out of 5 to both upper extremities.  There is symmetric strength bilaterally.  Patient send to have some joint swelling to his left first second and third MCP joints.    ED Course   Labs Reviewed - No data to display  Differential diagnosis includes spinal stenosis versus cervical radiculopathy    Discussed with the patient that with 2 weeks of persistent bilateral upper extremity weakness, he should have further evaluation with his primary care doctor, neurology and possible outpatient MRI of his cervical spine.    In regards to his hand pain and joint swelling, patient will be discharged home with a Medrol Dosepak.       MDM   Patient was screened and evaluated during this visit.   As a treating physician attending to the patient, I determined, within reasonable clinical confidence and prior to discharge, that an emergency medical condition was not or was no longer present.  There was no indication for further evaluation, treatment or admission on an emergency basis.  Comprehensive verbal and written discharge and follow-up instructions were provided to help prevent relapse or worsening.  Patient was instructed to follow-up with her primary care provider for further evaluation and treatment, but to return immediately to the ER for worsening, concerning, new, changing or persisting symptoms.  I discussed the case with the patient and they had no questions, complaints, or concerns.  Patient felt comfortable going home.    ^^Please note that this report has been produced using speech recognition software and may contain errors related to that system including, but not limited to, errors in grammar, punctuation, and spelling, as well as  words and phrases that possibly may have been recognized inappropriately.  If there are any questions or concerns, contact the dictating provider for clarification                                 Medical Decision Making      Disposition and Plan     Clinical Impression:  1. Bilateral arm weakness    2. Arthralgia, unspecified joint         Disposition:  Discharge  7/2/2024 11:54 am    Follow-up:  Jose L Fam MD  [address] [place] [id number]  [phone]    Schedule an appointment as soon as possible for a visit             Medications Prescribed:  Current Discharge Medication List        START taking these medications    Details   methylPREDNISolone (MEDROL) 4 MG Oral Tablet Therapy Pack Dosepack: take as directed  Qty: 1 each, Refills: 0

## 2024-10-03 ENCOUNTER — OFFICE VISIT (OUTPATIENT)
Dept: FAMILY MEDICINE CLINIC | Facility: CLINIC | Age: 54
End: 2024-10-03
Payer: COMMERCIAL

## 2024-10-03 VITALS
BODY MASS INDEX: 31 KG/M2 | HEART RATE: 114 BPM | DIASTOLIC BLOOD PRESSURE: 96 MMHG | OXYGEN SATURATION: 98 % | SYSTOLIC BLOOD PRESSURE: 134 MMHG | WEIGHT: 195 LBS

## 2024-10-03 DIAGNOSIS — M79.641 PAIN IN BOTH HANDS: Primary | ICD-10-CM

## 2024-10-03 DIAGNOSIS — Z12.11 COLON CANCER SCREENING: ICD-10-CM

## 2024-10-03 DIAGNOSIS — Z00.00 GENERAL MEDICAL EXAM: ICD-10-CM

## 2024-10-03 DIAGNOSIS — M25.531 RIGHT WRIST PAIN: ICD-10-CM

## 2024-10-03 DIAGNOSIS — M79.642 PAIN IN BOTH HANDS: Primary | ICD-10-CM

## 2024-10-03 DIAGNOSIS — R20.2 PARESTHESIA OF ARM: ICD-10-CM

## 2024-10-03 PROCEDURE — 3075F SYST BP GE 130 - 139MM HG: CPT | Performed by: FAMILY MEDICINE

## 2024-10-03 PROCEDURE — 99214 OFFICE O/P EST MOD 30 MIN: CPT | Performed by: FAMILY MEDICINE

## 2024-10-03 PROCEDURE — 3080F DIAST BP >= 90 MM HG: CPT | Performed by: FAMILY MEDICINE

## 2024-10-03 RX ORDER — ACETAMINOPHEN 100MG/10ML
SYRINGE (ML) INTRAVENOUS
COMMUNITY
Start: 2024-08-01 | End: 2024-10-03
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
## 2024-10-08 ENCOUNTER — HOSPITAL ENCOUNTER (OUTPATIENT)
Dept: GENERAL RADIOLOGY | Age: 54
Discharge: HOME | End: 2024-10-08
Attending: FAMILY MEDICINE
Payer: COMMERCIAL

## 2024-10-08 DIAGNOSIS — M79.642 PAIN IN BOTH HANDS: ICD-10-CM

## 2024-10-08 DIAGNOSIS — M79.641 PAIN IN BOTH HANDS: ICD-10-CM

## 2024-10-08 DIAGNOSIS — M25.531 RIGHT WRIST PAIN: ICD-10-CM

## 2024-10-08 PROCEDURE — 73110 X-RAY EXAM OF WRIST: CPT | Performed by: FAMILY MEDICINE

## 2024-10-08 PROCEDURE — 73130 X-RAY EXAM OF HAND: CPT | Performed by: FAMILY MEDICINE

## 2024-12-17 ENCOUNTER — LAB ENCOUNTER (OUTPATIENT)
Dept: LAB | Age: 54
End: 2024-12-17
Attending: FAMILY MEDICINE
Payer: COMMERCIAL

## 2024-12-17 DIAGNOSIS — Z00.00 GENERAL MEDICAL EXAM: ICD-10-CM

## 2024-12-17 LAB
ALBUMIN SERPL-MCNC: 4.9 G/DL (ref 3.2–4.8)
ALBUMIN/GLOB SERPL: 1.6 {RATIO} (ref 1–2)
ALP LIVER SERPL-CCNC: 71 U/L
ALT SERPL-CCNC: 56 U/L
ANION GAP SERPL CALC-SCNC: 6 MMOL/L (ref 0–18)
AST SERPL-CCNC: 30 U/L (ref ?–34)
BASOPHILS # BLD AUTO: 0.11 X10(3) UL (ref 0–0.2)
BASOPHILS NFR BLD AUTO: 1.4 %
BILIRUB SERPL-MCNC: 0.6 MG/DL (ref 0.3–1.2)
BUN BLD-MCNC: 13 MG/DL (ref 9–23)
CALCIUM BLD-MCNC: 10.3 MG/DL (ref 8.7–10.4)
CHLORIDE SERPL-SCNC: 102 MMOL/L (ref 98–112)
CHOLEST SERPL-MCNC: 214 MG/DL (ref ?–200)
CO2 SERPL-SCNC: 30 MMOL/L (ref 21–32)
COMPLEXED PSA SERPL-MCNC: 0.3 NG/ML (ref ?–4)
CREAT BLD-MCNC: 0.96 MG/DL
EGFRCR SERPLBLD CKD-EPI 2021: 94 ML/MIN/1.73M2 (ref 60–?)
EOSINOPHIL # BLD AUTO: 0.3 X10(3) UL (ref 0–0.7)
EOSINOPHIL NFR BLD AUTO: 3.9 %
ERYTHROCYTE [DISTWIDTH] IN BLOOD BY AUTOMATED COUNT: 12.2 %
EST. AVERAGE GLUCOSE BLD GHB EST-MCNC: 166 MG/DL (ref 68–126)
FASTING PATIENT LIPID ANSWER: YES
FASTING STATUS PATIENT QL REPORTED: YES
GLOBULIN PLAS-MCNC: 3 G/DL (ref 2–3.5)
GLUCOSE BLD-MCNC: 146 MG/DL (ref 70–99)
HBA1C MFR BLD: 7.4 % (ref ?–5.7)
HCT VFR BLD AUTO: 46.2 %
HDLC SERPL-MCNC: 57 MG/DL (ref 40–59)
HGB BLD-MCNC: 16.6 G/DL
IMM GRANULOCYTES # BLD AUTO: 0.04 X10(3) UL (ref 0–1)
IMM GRANULOCYTES NFR BLD: 0.5 %
LDLC SERPL CALC-MCNC: 135 MG/DL (ref ?–100)
LYMPHOCYTES # BLD AUTO: 2.57 X10(3) UL (ref 1–4)
LYMPHOCYTES NFR BLD AUTO: 33.1 %
MCH RBC QN AUTO: 32 PG (ref 26–34)
MCHC RBC AUTO-ENTMCNC: 35.9 G/DL (ref 31–37)
MCV RBC AUTO: 89 FL
MONOCYTES # BLD AUTO: 0.79 X10(3) UL (ref 0.1–1)
MONOCYTES NFR BLD AUTO: 10.2 %
NEUTROPHILS # BLD AUTO: 3.95 X10 (3) UL (ref 1.5–7.7)
NEUTROPHILS # BLD AUTO: 3.95 X10(3) UL (ref 1.5–7.7)
NEUTROPHILS NFR BLD AUTO: 50.9 %
NONHDLC SERPL-MCNC: 157 MG/DL (ref ?–130)
OSMOLALITY SERPL CALC.SUM OF ELEC: 289 MOSM/KG (ref 275–295)
PLATELET # BLD AUTO: 183 10(3)UL (ref 150–450)
POTASSIUM SERPL-SCNC: 4.3 MMOL/L (ref 3.5–5.1)
PROT SERPL-MCNC: 7.9 G/DL (ref 5.7–8.2)
RBC # BLD AUTO: 5.19 X10(6)UL
SODIUM SERPL-SCNC: 138 MMOL/L (ref 136–145)
T4 FREE SERPL-MCNC: 1.2 NG/DL (ref 0.8–1.7)
TRIGL SERPL-MCNC: 122 MG/DL (ref 30–149)
TSI SER-ACNC: 1.26 UIU/ML (ref 0.55–4.78)
VIT B12 SERPL-MCNC: 299 PG/ML (ref 211–911)
VIT D+METAB SERPL-MCNC: 14.8 NG/ML (ref 30–100)
VLDLC SERPL CALC-MCNC: 22 MG/DL (ref 0–30)
WBC # BLD AUTO: 7.8 X10(3) UL (ref 4–11)

## 2024-12-17 PROCEDURE — 82607 VITAMIN B-12: CPT

## 2024-12-17 PROCEDURE — 80053 COMPREHEN METABOLIC PANEL: CPT

## 2024-12-17 PROCEDURE — 85025 COMPLETE CBC W/AUTO DIFF WBC: CPT

## 2024-12-17 PROCEDURE — 82306 VITAMIN D 25 HYDROXY: CPT

## 2024-12-17 PROCEDURE — 83036 HEMOGLOBIN GLYCOSYLATED A1C: CPT

## 2024-12-17 PROCEDURE — 84439 ASSAY OF FREE THYROXINE: CPT

## 2024-12-17 PROCEDURE — 84443 ASSAY THYROID STIM HORMONE: CPT

## 2024-12-17 PROCEDURE — 80061 LIPID PANEL: CPT

## 2024-12-17 PROCEDURE — 36415 COLL VENOUS BLD VENIPUNCTURE: CPT

## 2024-12-18 ENCOUNTER — OFFICE VISIT (OUTPATIENT)
Dept: FAMILY MEDICINE CLINIC | Facility: CLINIC | Age: 54
End: 2024-12-18
Payer: COMMERCIAL

## 2024-12-18 VITALS
BODY MASS INDEX: 31.39 KG/M2 | HEIGHT: 67 IN | WEIGHT: 200 LBS | OXYGEN SATURATION: 97 % | HEART RATE: 90 BPM | SYSTOLIC BLOOD PRESSURE: 128 MMHG | DIASTOLIC BLOOD PRESSURE: 90 MMHG | RESPIRATION RATE: 16 BRPM

## 2024-12-18 DIAGNOSIS — E11.65 TYPE 2 DIABETES MELLITUS WITH HYPERGLYCEMIA, WITHOUT LONG-TERM CURRENT USE OF INSULIN (HCC): ICD-10-CM

## 2024-12-18 DIAGNOSIS — Z00.00 WELLNESS EXAMINATION: Primary | ICD-10-CM

## 2024-12-18 DIAGNOSIS — M25.512 BILATERAL SHOULDER PAIN, UNSPECIFIED CHRONICITY: ICD-10-CM

## 2024-12-18 DIAGNOSIS — M25.511 BILATERAL SHOULDER PAIN, UNSPECIFIED CHRONICITY: ICD-10-CM

## 2024-12-18 DIAGNOSIS — R20.2 PARESTHESIA OF ARM: ICD-10-CM

## 2024-12-18 DIAGNOSIS — B36.9 FUNGAL DERMATITIS: ICD-10-CM

## 2024-12-18 DIAGNOSIS — M79.641 PAIN IN BOTH HANDS: ICD-10-CM

## 2024-12-18 DIAGNOSIS — M79.642 PAIN IN BOTH HANDS: ICD-10-CM

## 2024-12-18 RX ORDER — METFORMIN HYDROCHLORIDE 500 MG/1
TABLET, EXTENDED RELEASE ORAL
Qty: 70 TABLET | Refills: 0 | Status: SHIPPED | OUTPATIENT
Start: 2024-12-18 | End: 2025-01-15

## 2024-12-18 RX ORDER — NAPROXEN SODIUM 220 MG/1
TABLET, FILM COATED ORAL
COMMUNITY
Start: 2024-09-01

## 2024-12-18 RX ORDER — GABAPENTIN 300 MG/1
300 CAPSULE ORAL 3 TIMES DAILY
Qty: 90 CAPSULE | Refills: 1 | Status: SHIPPED | OUTPATIENT
Start: 2024-12-18

## 2024-12-18 RX ORDER — TERBINAFINE HYDROCHLORIDE 250 MG/1
250 TABLET ORAL DAILY
Qty: 14 TABLET | Refills: 0 | Status: SHIPPED | OUTPATIENT
Start: 2024-12-18
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
## 2024-12-19 ENCOUNTER — PATIENT MESSAGE (OUTPATIENT)
Dept: FAMILY MEDICINE CLINIC | Facility: CLINIC | Age: 54
End: 2024-12-19

## 2024-12-19 DIAGNOSIS — E11.65 TYPE 2 DIABETES MELLITUS WITH HYPERGLYCEMIA, WITHOUT LONG-TERM CURRENT USE OF INSULIN (HCC): Primary | ICD-10-CM

## 2025-01-11 DIAGNOSIS — E11.65 TYPE 2 DIABETES MELLITUS WITH HYPERGLYCEMIA, WITHOUT LONG-TERM CURRENT USE OF INSULIN (HCC): ICD-10-CM

## 2025-01-13 RX ORDER — METFORMIN HYDROCHLORIDE 500 MG/1
TABLET, EXTENDED RELEASE ORAL
Qty: 70 TABLET | Refills: 0 | Status: SHIPPED | OUTPATIENT
Start: 2025-01-13

## 2025-01-21 ENCOUNTER — OFFICE VISIT (OUTPATIENT)
Dept: ELECTROPHYSIOLOGY | Facility: HOSPITAL | Age: 55
End: 2025-01-21
Attending: FAMILY MEDICINE
Payer: COMMERCIAL

## 2025-01-21 DIAGNOSIS — R20.2 PARESTHESIA OF ARM: ICD-10-CM

## 2025-01-21 PROCEDURE — 95886 MUSC TEST DONE W/N TEST COMP: CPT

## 2025-01-21 PROCEDURE — 95911 NRV CNDJ TEST 9-10 STUDIES: CPT

## 2025-01-21 NOTE — PROCEDURES
[facility]  Nerve Conduction & EMG Report                      Shauna Schneider D.O.  [facility]   EMG department   [address] [place] [id number]  [phone]          Patient name: Pepe Olson Jr.  YOB: 1970  Referring provider: Dr. Davis  Reason for study: Eval for mononeuropathy or radiculopathy  Brief history: 54 year old male with 1 year history of numbness in the left lateral 3 digits      Sensory and motor nerve conduction studies, and needle EMG:  Please see separate sheet for waveforms and quantitative nerve conduction data, as well as for tabulated form of electromyographic data.      Summary:   1.  The left median sensory response was absent.  The right median sensory response had prolonged distal latency.  2.  The bilateral ulnar sensory responses were normal, as was the left radial sensory response.  3.  The left median motor response had decreased amplitude.  Distal latency and conduction velocity were normal.  The right median motor response had prolonged distal latency, and amplitude was normal, as was the conduction velocity.  4.  The left ulnar motor response was normal.  5.  The right ulnar motor response was normal as well.  6.  Needle EMG using a concentric needle was performed on selected muscles of the left upper extremity.  All muscles tested were normal.    Conclusion:  1.  There is electrophysiologic evidence of severe left and mild right compressive median mononeuropathies at the wrist, carpal tunnel.  2.  There is no electrophysiologic evidence of a left cervical radiculopathy.      Shauna Schneider DO  Neurology and Neuromuscular medicine  [facility]

## 2025-01-22 ENCOUNTER — TELEPHONE (OUTPATIENT)
Dept: FAMILY MEDICINE CLINIC | Facility: CLINIC | Age: 55
End: 2025-01-22

## 2025-01-22 DIAGNOSIS — G56.03 BILATERAL CARPAL TUNNEL SYNDROME: Primary | ICD-10-CM

## 2025-01-22 PROBLEM — R20.2 PARESTHESIA OF ARM: Status: ACTIVE | Noted: 2025-01-22

## 2025-01-31 DIAGNOSIS — M79.641 PAIN IN BOTH HANDS: ICD-10-CM

## 2025-01-31 DIAGNOSIS — E11.65 TYPE 2 DIABETES MELLITUS WITH HYPERGLYCEMIA, WITHOUT LONG-TERM CURRENT USE OF INSULIN (HCC): ICD-10-CM

## 2025-01-31 DIAGNOSIS — R20.2 PARESTHESIA OF ARM: ICD-10-CM

## 2025-01-31 DIAGNOSIS — M79.642 PAIN IN BOTH HANDS: ICD-10-CM

## 2025-02-01 NOTE — TELEPHONE ENCOUNTER
A refill request was received for:  Requested Prescriptions     Pending Prescriptions Disp Refills    gabapentin 300 MG Oral Cap 90 capsule 1     Sig: Take 1 capsule (300 mg total) by mouth 3 (three) times daily.    metFORMIN  MG Oral Tablet 24 Hr 70 tablet 0       Last refill date:     Gabapentin 12/18/2024  Metformin 1/13/2025    Last office visit: 12/18/2024    Follow up due:  Future Appointments   Date Time Provider Department Center   2/10/2025 12:30 PM Uvaldo Velásquez MD EM[facility]

## 2025-02-02 RX ORDER — GABAPENTIN 300 MG/1
300 CAPSULE ORAL 3 TIMES DAILY
Qty: 90 CAPSULE | Refills: 1 | Status: SHIPPED | OUTPATIENT
Start: 2025-02-02

## 2025-02-02 RX ORDER — METFORMIN HYDROCHLORIDE 500 MG/1
1000 TABLET, EXTENDED RELEASE ORAL 2 TIMES DAILY WITH MEALS
Qty: 360 TABLET | Refills: 1 | Status: SHIPPED | OUTPATIENT
Start: 2025-02-02 | End: 2025-08-01

## 2025-02-10 ENCOUNTER — TELEPHONE (OUTPATIENT)
Dept: ORTHOPEDICS CLINIC | Facility: CLINIC | Age: 55
End: 2025-02-10

## 2025-02-10 ENCOUNTER — OFFICE VISIT (OUTPATIENT)
Dept: ORTHOPEDICS CLINIC | Facility: CLINIC | Age: 55
End: 2025-02-10
Payer: COMMERCIAL

## 2025-02-10 VITALS — WEIGHT: 200 LBS | HEIGHT: 67 IN | BODY MASS INDEX: 31.39 KG/M2

## 2025-02-10 DIAGNOSIS — G56.02 CARPAL TUNNEL SYNDROME OF LEFT WRIST: Primary | ICD-10-CM

## 2025-02-10 DIAGNOSIS — G56.03 BILATERAL CARPAL TUNNEL SYNDROME: ICD-10-CM

## 2025-02-10 DIAGNOSIS — G56.01 CARPAL TUNNEL SYNDROME OF RIGHT WRIST: ICD-10-CM

## 2025-02-10 NOTE — TELEPHONE ENCOUNTER
[facility]    Post Op Appt:  3/4/2025 930    Case ID: [id number]     Notes:               SURGICAL BOOKING SHEET   Name: Pepe Olson Jr.  MRN: [id number]   : 1970     Surgical Date:    [facility]    Surgical Consent:    left endoscopic carpal tunnel release, possible open   Diagnosis:         ICD-10-CM     1. Carpal tunnel syndrome of left wrist  G56.02            Workers Comp: No   Procedure Codes:    Endoscopic carpal tunnel release (CPT 20281)   Disposition:    Outpatient   Operative Time:    45 mins   Antibiotics:    None   Anesthesia Type:    Monitored Anesthesia Care (MAC) + Local by Surgeon   Clearance:     None   Equipment:    ECTR: *Please see Dr. Velásquez ECTR Pref Card* [place] Blade, ARTHREX Endoscopic System, Upper Arm 18-inch tourniquet, Local Pre-Mix (1% lidocaine w/ epi, 0.5% marcaine, 50/50 mix and 20cc total), 4-0 moncryl P3, Dermabond, Telfa x2 +Tegaderm x2. Standby: Lead Hand   Assistant:    Assistant Surgery: No   Follow Up:    Follow up 10-14 days after surgery   Pain Medication:    OTC   Therapy:    None
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
## 2025-02-10 NOTE — PROGRESS NOTES
SURGICAL BOOKING SHEET   Name: Pepe Olson Jr.  MRN: [id number]   : 1970    Surgical Date:    [facility]    Surgical Consent:   left endoscopic carpal tunnel release, possible open   Diagnosis:      ICD-10-CM    1. Carpal tunnel syndrome of left wrist  G56.02          Workers Comp: No   Procedure Codes:   Endoscopic carpal tunnel release (CPT 65428)   Disposition:   Outpatient   Operative Time:   45 mins   Antibiotics:   None   Anesthesia Type:   Monitored Anesthesia Care (MAC) + Local by Surgeon   Clearance:    None   Equipment:   ECTR: *Please see Dr. Velásquez ECTR Pref Card* [place] Blade, ARTHREX Endoscopic System, Upper Arm 18-inch tourniquet, Local Pre-Mix (1% lidocaine w/ epi, 0.5% marcaine, 50/50 mix and 20cc total), 4-0 moncryl P3, Dermabond, Telfa x2 +Tegaderm x2. Standby: Lead Hand   Assistant:   Assistant Surgery: No   Follow Up:   Follow up 10-14 days after surgery   Pain Medication:   OTC   Therapy:   None

## 2025-02-12 ENCOUNTER — LAB ENCOUNTER (OUTPATIENT)
Dept: LAB | Facility: HOSPITAL | Age: 55
End: 2025-02-12
Attending: STUDENT IN AN ORGANIZED HEALTH CARE EDUCATION/TRAINING PROGRAM
Payer: COMMERCIAL

## 2025-02-12 DIAGNOSIS — G56.03 BILATERAL CARPAL TUNNEL SYNDROME: ICD-10-CM

## 2025-02-12 LAB
EST. AVERAGE GLUCOSE BLD GHB EST-MCNC: 126 MG/DL (ref 68–126)
HBA1C MFR BLD: 6 % (ref ?–5.7)

## 2025-02-12 PROCEDURE — 83036 HEMOGLOBIN GLYCOSYLATED A1C: CPT

## 2025-02-12 PROCEDURE — 36415 COLL VENOUS BLD VENIPUNCTURE: CPT

## 2025-02-13 ENCOUNTER — DOCUMENTATION ONLY (OUTPATIENT)
Facility: CLINIC | Age: 55
End: 2025-02-13

## 2025-02-14 NOTE — PROGRESS NOTES
HgB A1C from 2/12/25 reviewed: 6.0. Will plan to proceed with surgery as scheduled.    Uvaldo Velásquez MD   Hand, Wrist, & Elbow Surgery  [email or web address]

## 2025-02-20 ENCOUNTER — DOCUMENTATION ONLY (OUTPATIENT)
Dept: ORTHOPEDICS CLINIC | Facility: CLINIC | Age: 55
End: 2025-02-20

## 2025-02-20 NOTE — PROGRESS NOTES
Post-Op Telephone Call Note:    Procedure: Left endoscopic carpal tunnel release    Date of Procedure: 2/19/25    Pepe Olson Jr. is post-operative day one status post the above surgery. I called the patient and spoke with them over the phone today. Overall they are recovering well from surgery. Pain is well controlled with over the counter medication. I once again discussed the expected post-operative course with the patient. I answered all of their questions regarding the surgical procedure and post-operative course. I will see Pepe Olson Jr. as scheduled on 3/4/25. Should they have any questions or concerns before their first post-operative appointment with me they were instructed to reach out to the office sooner.     Uvaldo Velásquez MD   Hand, Wrist, & Elbow Surgery  [email or web address]

## 2025-03-03 ENCOUNTER — TELEPHONE (OUTPATIENT)
Dept: ORTHOPEDICS CLINIC | Facility: CLINIC | Age: 55
End: 2025-03-03

## 2025-03-03 NOTE — TELEPHONE ENCOUNTER
Patient called to advise he needs forms completed ASAP due to them being due today - His job only gave him until today to submit     Type of Leave: Continuous   Reason for Leave: Surgery  Start date of leave: 02/19/25  End date of leave: 03/30/25  RTW estimate 03/31/25

## 2025-03-03 NOTE — TELEPHONE ENCOUNTER
Dr. Velásquez      Please sign off on form if you agree to: Family Medical Leave Act Left endoscopic carpal tunnel release Start: 02/19/25  End: 03/30/25  (place your signature on the first page only)    -From your Inbasket, Highlight the patient and click Chart   -Double click the 03/03/25 Forms Completion telephone encounter  -Scroll down to the Media section   -Click the blue Hyperlink: disability Dr Velásquez 03/03/25  -Click Acknowledge located in the top right ribbon/menu   -Drag the mouse into the blank space of the document and a + sign will appear. Left click to   electronically sign the document.     Thank you,    Alvin'

## 2025-03-04 ENCOUNTER — OFFICE VISIT (OUTPATIENT)
Dept: ORTHOPEDICS CLINIC | Facility: CLINIC | Age: 55
End: 2025-03-04
Payer: COMMERCIAL

## 2025-03-04 ENCOUNTER — TELEPHONE (OUTPATIENT)
Dept: ORTHOPEDICS CLINIC | Facility: CLINIC | Age: 55
End: 2025-03-04

## 2025-03-04 VITALS — BODY MASS INDEX: 31.39 KG/M2 | HEIGHT: 67 IN | WEIGHT: 200 LBS

## 2025-03-04 DIAGNOSIS — G56.02 CARPAL TUNNEL SYNDROME OF LEFT WRIST: ICD-10-CM

## 2025-03-04 DIAGNOSIS — G56.01 CARPAL TUNNEL SYNDROME OF RIGHT WRIST: Primary | ICD-10-CM

## 2025-03-04 PROCEDURE — 3008F BODY MASS INDEX DOCD: CPT | Performed by: STUDENT IN AN ORGANIZED HEALTH CARE EDUCATION/TRAINING PROGRAM

## 2025-03-04 PROCEDURE — 99213 OFFICE O/P EST LOW 20 MIN: CPT | Performed by: STUDENT IN AN ORGANIZED HEALTH CARE EDUCATION/TRAINING PROGRAM

## 2025-03-04 NOTE — PROGRESS NOTES
SURGICAL BOOKING SHEET   Name: Pepe Olson Jr.  MRN: [id number]   : 1970    Surgical Date:   3/12 [facility]   Surgical Consent:   right endoscopic carpal tunnel release, possible open   Diagnosis:      ICD-10-CM    1. Carpal tunnel syndrome of right wrist  G56.01          Workers Comp: No   Procedure Codes:   Endoscopic carpal tunnel release (CPT 77851)   Disposition:   Outpatient   Operative Time:   45 mins   Antibiotics:   None   Anesthesia Type:   Monitored Anesthesia Care (MAC) + Local by Surgeon   Clearance:    None   Equipment:   ECTR: *Please see Dr. Velásquez ECTR Pref Card* [place] Blade, ARTHREX Endoscopic System, Upper Arm 18-inch tourniquet, Local Pre-Mix (1% lidocaine w/ epi, 0.5% marcaine, 50/50 mix and 20cc total), 4-0 moncryl P3, Dermabond, Telfa x2 +Tegaderm x2. Standby: Lead Hand   Assistant:   Assistant Surgery: No   Follow Up:   Follow up 10-14 days after surgery   Pain Medication:   OTC   Therapy:   None

## 2025-03-04 NOTE — TELEPHONE ENCOUNTER
Date of Surgery: [facility] 3/12/2025       Post Op Appt:  3/25/2025 10    Case ID: [id number]     Notes:             SURGICAL BOOKING SHEET   Name: Pepe Olson Jr.  MRN: [id number]   : 1970     Surgical Date:    3/12 [facility]   Surgical Consent:    right endoscopic carpal tunnel release, possible open   Diagnosis:         ICD-10-CM     1. Carpal tunnel syndrome of right wrist  G56.01            Workers Comp: No   Procedure Codes:    Endoscopic carpal tunnel release (CPT 46610)   Disposition:    Outpatient   Operative Time:    45 mins   Antibiotics:    None   Anesthesia Type:    Monitored Anesthesia Care (MAC) + Local by Surgeon   Clearance:     None   Equipment:    ECTR: *Please see Dr. Velásquez ECTR Pref Card* [place] Blade, ARTHREX Endoscopic System, Upper Arm 18-inch tourniquet, Local Pre-Mix (1% lidocaine w/ epi, 0.5% marcaine, 50/50 mix and 20cc total), 4-0 moncryl P3, Dermabond, Telfa x2 +Tegaderm x2. Standby: Lead Hand   Assistant:    Assistant Surgery: No   Follow Up:    Follow up 10-14 days after surgery   Pain Medication:    OTC   Therapy:    None

## 2025-03-04 NOTE — PROGRESS NOTES
Clinic Note     Allergies[1]    CC: Status post left endoscopic carpal tunnel release on 2/19/25, also with right carpal tunnel syndrome    HPI:  This patient is status post the above surgery. Overall doing excellent. He states that he no longer wakes up at night with pain and numbness in his left hand, although he does continue to wake up at night from the right hand symptoms. Regarding his left hand, he feels near complete resolution in the severe numbness of his left thumb, index, and middle fingers. Trace numbness remains to these fingers, but he feels as though even that is improving.    He continues to have numbness and tingling in the right thumb, index, and middle fingers affecting him on a daily basis and waking him at night, and he is interested in proceeding forward with surgical intervention for the right side.     Overall pleased with post-op progress thus far. Symptoms are significantly improved compared to pre-operative symptoms.    Review of Systems:  Negative unless stated in HPI.    Past Medical History:    Arthritis    Diabetes (HCC)    Osteoarthritis    Unknown    Plantar fasciitis     Past Surgical History:   Procedure Laterality Date    Appendectomy      Other surgical history  20+ yrs    Appendectomy    Vasectomy  ?     Social History            Occupational History    Not on file   Tobacco Use    Smoking status: Never    Smokeless tobacco: Never    Tobacco comments:       two per month   Vaping Use    Vaping status: Never Used   Substance and Sexual Activity    Alcohol use: Yes       Alcohol/week: 5.0 standard drinks of alcohol       Types: 5 Cans of beer per week    Drug use: No       Types: Cannabis       Comment: gummies    Sexual activity: Not on file       Physical Exam:     Ht 5' 7\" (1.702 m)   Wt 200 lb (90.7 kg)   BMI 31.32 kg/m²     Constitutional: NAD. AOx3. Well-developed and Well-nourished.   Psychiatric: Normal mood/ affect/ behavior. Judgment and thought content normal.      CV: regular rate  Lungs: nonlabored respirations    Left Upper Extremity:     Inspection    Surgical wound is clean, dry, intact without signs of infection. No signs of wound dehiscence.    Palpation    Non-tender to palpation over surgical wound.      ROM    ROM of the wrist is full and painless.    Able to make a full tight fist and extend all fingers with ease.     Neurovascular    2-point discrimination intact to 10mm in left thumb, index, and middle fingers. Intact to 5mm in left ring and small fingers. Consistent with pre-operative exam. Otherwise normal sensation in the median, ulnar, and radial nerve distribution. Normal motor function of muscles innervated by median/AIN, ulnar, and radial/PIN nerves.    Normally perfused hand(s).                 Right Upper Extremity:   Inspection: Skin Intact. No skin lesions. No gross deformity.   Palpation: No focal areas of TTP   Motion: Elbow: normal bilateral symmetric ext/flex  Wrist: normal bilateral symmetric ext/flex/sup/pro  Fingers: able to make a full composite fist   Vascular Brisk capillary refill in all digits         Median Nerve Exam Right Left   Durkan's +    Tinel's at Wrist +    Sensation normal    PCBr Sensation normal    APB Weakness Yes, 4/5    Thenar Atrophy No    FDP to index finger weakness Yes, 4/5       CTS-6 Evaluation Tool - RIGHT  Numbness predominantly or exclusively in median nerve territory              3.5 / (3.5) points  Nocturnal symptoms                                                                        4 / (4) points  Thenar atrophy and/or weakness                                                                 5 / (5) points  Positive Phalen's test                                                                      5 / (5) points  Loss of 2 point discrimination (>5mm)                                            4.5 / (4.5) points  Positive Tinel Sign                                                                                         4 / (4) points                                                                                                                       Total: 26 / (26) points        Ulnar Nerve Exam Right Left   Sensation normal    Tinel's at Medial Elbow neg    Elbow Flexion, Nerve Compression Testing neg    1st WILLIAN Weakness No    Hypothenar Atrophy No    FDP to small finger weakness No       Radial Nerve Exam  Right Left   Radial Sensory normal        2-point discrimination intact to 8mm in right thumb, index, and middle fingers. Intact to 5mm in right ring and small fingers.      Diagnostic Studies:  EMG/NCV from 1/21/25:     Consistent with severe carpal tunnel syndrome on the left, and mild carpal tunnel syndrome on the right    Assessment/Plan:  54 year old male status post left endoscopic carpal tunnel release on 2/19/25, also with right carpal tunnel syndrome.    I reviewed the patient's electronic medical record, including the pertinent office notes, medical/surgical history, medications and images. I specifically reviewed the images noted above, independently and discussed my independent interpretation of these images in combination with the pertinent positive and negative findings in my clinical exam with the patient    Status post left endoscopic carpal tunnel release on 2/19/25.  Pepe is doing excellent from this standpoint. Regarding his lingering mild numbness in the left thumb, index, and middle fingers, as we discussed pre-operatively I discussed that it may take several months for this to improve, and improvements can even be seen up to a year with respect to this. However it is also possible that the numbness does not fully resolve, and he understands this as well. He is overall very pleased with his post-operative outcome thus far and can continue to resume activities as tolerated with respect to the left upper extremity.     2.     Right carpal tunnel syndrome  We again had an extensive discussion  today regarding the nature of a presumptive compressive neuropathy, and specifically the nature of carpal tunnel syndrome including signs and symptoms of the condition, general considerations for causation and progression of the condition, and strategies for treatment. The nature of progressive nerve dysfunction including reversible and irreversible changes have been discussed, along with the influence of timing on treatment considerations and prognosis for recovery. The potential for other or secondary sources of nerve compression or other nerve involvement have been reviewed thoroughly given his history of cervical pathology as well. The indications and nature of surgical versus nonsurgical options have been discussed, as well as the inherent risks and benefits of each treatment. The patient verbalizes understanding of our discussion.      Patient wishes to proceed with surgical release which I believe is very reasonable given clinical exam, EMG/NCV findings, CTS-6 score of 26, and failure of non-operative treatment.     Surgical Procedure Consent: right endoscopic carpal tunnel release, possible open  The patient was indicated for carpal tunnel release as patient had failed nonoperative management of night splinting, rest, activity modifications, therapy, nerve glides, NSAIDs or other appropriate nonoperative care. The symptoms were progressive and more severe and patient did not want to continue with nonoperative care and wanted surgery at this point. Alternatives to continued nonoperative care, however, were discussed and offered.     Today, I discussed with Pepe Olson Jr. the surgical procedure consent at length. I reviewed the nature of the condition, the indications for surgery, the procedure itself, the post-operative course, the expected outcomes as well as the surgical risks and alternatives. Potential benefits, the main goal of nerve decompression of surgery, are to halt progression. Any reversal of  this would be added benefit on top of this. There was no guarantee of the outcome. The potential risks were again discussed with the patient which include, but are not limited to, bleeding, infection, nerve or artery damage, anesthesia or wound healing problems, continued muscle atrophy, continued paresthesias or pain, need for further surgery, bleeding, and complex regional pain syndrome. The risks and benefits of endoscopic as well as open carpal tunnel release surgery were both discussed, as well as the possibility of converting to an open carpal tunnel release if deemed necessary intraoperatively during the endoscopic portion. Pepe Olson Jr. demonstrated understanding and asked appropriate questions which I answered to their satisfaction. Following this discussion, Pepe Olson Jr. wished to proceed with surgical intervention.    Uvaldo Velásquez MD   Hand, Wrist, & Elbow Surgery  [email or web address]       [1] No Known Allergies

## 2025-03-13 ENCOUNTER — DOCUMENTATION ONLY (OUTPATIENT)
Dept: ORTHOPEDICS CLINIC | Facility: CLINIC | Age: 55
End: 2025-03-13

## 2025-03-13 NOTE — PROGRESS NOTES
Post-Op Telephone Call Note:    Procedure: Right endoscopic carpal tunnel release    Date of Procedure: 3/12/25    Pepe Olson Jr. is post-operative day one status post the above surgery. I called and left a voicemail for the patient today. I once again discussed the expected post-operative course with the patient. I will see Pepe Olson Jr. as scheduled on 3/25/25. Should they have any questions or concerns before their first post-operative appointment with me they were instructed to reach out to the office sooner.     Uvaldo Velásquez MD   Hand, Wrist, & Elbow Surgery  [email or web address]

## 2025-03-18 ENCOUNTER — OFFICE VISIT (OUTPATIENT)
Dept: FAMILY MEDICINE CLINIC | Facility: CLINIC | Age: 55
End: 2025-03-18
Payer: COMMERCIAL

## 2025-03-18 VITALS
RESPIRATION RATE: 15 BRPM | SYSTOLIC BLOOD PRESSURE: 138 MMHG | BODY MASS INDEX: 31.86 KG/M2 | WEIGHT: 203 LBS | OXYGEN SATURATION: 97 % | HEART RATE: 102 BPM | DIASTOLIC BLOOD PRESSURE: 80 MMHG | HEIGHT: 67 IN

## 2025-03-18 DIAGNOSIS — H61.891 IRRITATION OF RIGHT EXTERNAL AUDITORY CANAL: ICD-10-CM

## 2025-03-18 DIAGNOSIS — E11.65 TYPE 2 DIABETES MELLITUS WITH HYPERGLYCEMIA, WITHOUT LONG-TERM CURRENT USE OF INSULIN (HCC): Primary | ICD-10-CM

## 2025-03-18 PROBLEM — R20.2 PARESTHESIA OF ARM: Status: RESOLVED | Noted: 2025-01-22 | Resolved: 2025-03-18

## 2025-03-18 PROCEDURE — 99213 OFFICE O/P EST LOW 20 MIN: CPT | Performed by: FAMILY MEDICINE

## 2025-03-18 PROCEDURE — 3044F HG A1C LEVEL LT 7.0%: CPT | Performed by: FAMILY MEDICINE

## 2025-03-18 PROCEDURE — 3075F SYST BP GE 130 - 139MM HG: CPT | Performed by: FAMILY MEDICINE

## 2025-03-18 PROCEDURE — 3008F BODY MASS INDEX DOCD: CPT | Performed by: FAMILY MEDICINE

## 2025-03-18 PROCEDURE — 3079F DIAST BP 80-89 MM HG: CPT | Performed by: FAMILY MEDICINE

## 2025-03-18 RX ORDER — NEOMYCIN SULFATE, POLYMYXIN B SULFATE, HYDROCORTISONE 3.5; 10000; 1 MG/ML; [USP'U]/ML; MG/ML
3 SOLUTION/ DROPS AURICULAR (OTIC) 3 TIMES DAILY
Qty: 10 ML | Refills: 0 | Status: SHIPPED | OUTPATIENT
Start: 2025-03-18

## 2025-03-18 NOTE — PROGRESS NOTES
[facility] Progress Note    SUBJECTIVE: Pepe Olson Jr. 54 year old male is here today for   Chief Complaint   Patient presents with    Diabetes     3 month follow up-newly diabetic and on medication. Doing well and last A1C  was 6.0 for his CTS surgery in 2/2025. No concerns     Ear Pain     Right ear pain and discomfort for 4 days        Left hand is doing better, right hand is still recovering, following with Dr. Velásquez.    Blood sugars have been doing well. 164 is overall average.    Ear discomfort, seems to feel his pulse in his ear, for 4 days now. No obvious other symptoms    PMH  Past Medical History:    Arthritis    Diabetes (HCC)    Osteoarthritis    Unknown    Plantar fasciitis        PSH  Past Surgical History:   Procedure Laterality Date    Appendectomy      Carpal tunnel release      Other surgical history  20+ yrs    Appendectomy    Vasectomy  ?        Social Hx:  No changes    ROS  See HPI    OBJECTIVE:  /80   Pulse 102   Resp 15   Ht 5' 7\" (1.702 m)   Wt 203 lb (92.1 kg)   SpO2 97%   BMI 31.79 kg/m²     Exam    ENT: right ear canal with swelling, redness, TM normal bilatrally        Labs:          Meds:   Current Outpatient Medications   Medication Sig Dispense Refill    neomycin-polymyxin-hydrocortisone 3.5-02781-8 Otic Solution Place 3 drops into the right ear 3 (three) times daily. 10 mL 0    acetaminophen 500 MG Oral Tab Take 2 tablets (1,000 mg total) by mouth as needed for Pain.      Glucose Blood (FREESTYLE LITE TEST) In Vitro Strip Use as directed daily to test blood sugar 100 strip 0    FreeStyle Lancets Does not apply Misc 1 Lancet by Finger stick route daily. Use as directed. 100 each 0    metFORMIN  MG Oral Tablet 24 Hr Take 2 tablets (1,000 mg total) by mouth 2 (two) times daily with meals. 360 tablet 1    Sildenafil Citrate 20 MG Oral Tab            Assessment/Plan  Pepe was seen today for diabetes and ear pain.    Diagnoses and all orders for this  visit:    Type 2 diabetes mellitus with hyperglycemia, without long-term current use of insulin (HCC)  -     Hemoglobin A1C [E]; Future  -     Microalb/Creat Ratio, Random Urine [E]; Future    Irritation of right external auditory canal  -     neomycin-polymyxin-hydrocortisone 3.5-94632-6 Otic Solution; Place 3 drops into the right ear 3 (three) times daily.         If this doesn't work consider steroid course for possible eustachian tube dysfunction.    Diabetes well rahel sage will put in repeat labs for 2-3 months         Total Time spent with patient and coordinating care:  20 minutes.    Follow up: as needed      Jose L Fam MD

## 2025-03-21 NOTE — TELEPHONE ENCOUNTER
Carla Velásquez,    Unfortunately your signature was not captured on the Family Medical Leave Act form below. If you could please sign again ay your earliest convenience that would be much appreciated.     Thank you  Vanessa

## 2025-03-25 ENCOUNTER — TELEPHONE (OUTPATIENT)
Dept: PHYSICAL THERAPY | Facility: HOSPITAL | Age: 55
End: 2025-03-25

## 2025-03-25 ENCOUNTER — OFFICE VISIT (OUTPATIENT)
Dept: ORTHOPEDICS CLINIC | Facility: CLINIC | Age: 55
End: 2025-03-25
Payer: COMMERCIAL

## 2025-03-25 DIAGNOSIS — G56.02 CARPAL TUNNEL SYNDROME OF LEFT WRIST: ICD-10-CM

## 2025-03-25 DIAGNOSIS — G56.01 CARPAL TUNNEL SYNDROME OF RIGHT WRIST: Primary | ICD-10-CM

## 2025-03-25 PROCEDURE — 99024 POSTOP FOLLOW-UP VISIT: CPT | Performed by: STUDENT IN AN ORGANIZED HEALTH CARE EDUCATION/TRAINING PROGRAM

## 2025-03-25 NOTE — TELEPHONE ENCOUNTER
Patient presented in office for appointment today (3/25/25) and notified that his work has not received FMLA forms, is there an update?    Patient's leave was extended through April 7th per provider

## 2025-03-25 NOTE — PROGRESS NOTES
Clinic Note     Allergies[1]    Date of Surgery:   Left endoscopic CTR - 2/19/25  Right endoscopic CTR - 3/12/25    HPI:  This patient is status post the above surgery. Overall doing well. Regarding his left hand, he is doing excellent and states he is more or less using that hand normally for activities. He has trace numbness to the thumb, index, and middle fingers but his symptoms are significantly improved in comparison to pre-op. Regarding his right hand, the numbness in his digits have completely resolved. He does however feel some pain over the palm, which is controlled with over the counter pain medication.    Overall pleased with post-op progress thus far. Symptoms are improved compared to pre-operative symptoms.    Review of Systems:  Negative unless stated in HPI.    Physical Exam:         Constitutional: NAD. AOx3. Well-developed and Well-nourished.   Psychiatric: Normal mood/ affect/ behavior. Judgment and thought content normal.     Left Upper Extremity:     Inspection    Surgical wound is well healed without signs of infection. No signs of wound dehiscence.    Palpation    Non-tender to palpation over surgical wound.      ROM    ROM of the wrist is full and painless.     Able to make a full fist and extend all fingers with ease.     Neurovascular    Normal sensation in the median, ulnar, and radial nerve distribution. Normal motor function of muscles innervated by median/AIN, ulnar, and radial/PIN nerves.    Normally perfused hand(s).               Right Upper Extremity:     Inspection    Surgical wound is well healing without signs of infection. No signs of wound dehiscence.    Palpation    Appropriately tender to palpation over surgical wound.      ROM    ROM of the wrist is full and painless.     Able to make a full fist and extend all fingers with ease.     Neurovascular    Normal sensation in the median, ulnar, and radial nerve distribution. Normal motor function of muscles innervated by  median/AIN, ulnar, and radial/PIN nerves.    Normally perfused hand(s).       Assessment/Plan:  54 year old male status post the above surgeries.    I reviewed the patient's electronic medical record, including the pertinent office notes, medical/surgical history, medications and images. I specifically reviewed the images noted above, independently and discussed my independent interpretation of these images in combination with the pertinent positive and negative findings in my clinical exam with the patient    1. Status post left endoscopic carpal tunnel release on 2/19/25.    Pepe continues to do excellent from this standpoint. Regarding his lingering mild numbness in the left thumb, index, and middle fingers, as we discussed pre-operatively and in prior visits I again discussed that it may take several months for this to improve, and improvements can even be seen up to a year with respect to this. However it is also possible that the numbness does not fully resolve, and he understands this as well. He is overall very pleased with his post-operative outcome thus far and can continue to resume activities as tolerated with respect to the left upper extremity.     2. Status post right endoscopic carpal tunnel release on 3/12/25.    Pepe is doing quite well with complete resolution of the numbness and tingling in his right hand. He does have some soreness over the palm, and I counseled him that this will likely gradually improve with time but we will continue to monitor. He is overall pleased with the outcome thus far. I provided him with an occupational therapy referral and he states he will consider this if needed. The expected post-operative course was discussed at length and Pepe verbalized understanding. All questions answered.     Follow Up: 4 weeks for repeat evaluation    Uvaldo Velásquez MD   Hand, Wrist, & Elbow Surgery  [email or web address]         [1] No Known Allergies

## 2025-04-16 DIAGNOSIS — M79.642 PAIN IN BOTH HANDS: ICD-10-CM

## 2025-04-16 DIAGNOSIS — R20.2 PARESTHESIA OF ARM: ICD-10-CM

## 2025-04-16 DIAGNOSIS — M79.641 PAIN IN BOTH HANDS: ICD-10-CM

## 2025-04-16 RX ORDER — GABAPENTIN 300 MG/1
300 CAPSULE ORAL 3 TIMES DAILY
Qty: 90 CAPSULE | Refills: 1 | Status: SHIPPED | OUTPATIENT
Start: 2025-04-16

## 2025-04-16 NOTE — TELEPHONE ENCOUNTER
A refill request was received for:  Requested Prescriptions     Pending Prescriptions Disp Refills    gabapentin 300 MG Oral Cap 90 capsule 1     Sig: Take 1 capsule (300 mg total) by mouth 3 (three) times daily.       Last refill date:   2/2/25    Last office visit: 3/18/25    Follow up due:  Future Appointments   Date Time Provider Department Center   4/22/2025  8:40 AM Uvaldo Velásquez MD [facility]

## 2025-04-22 ENCOUNTER — OFFICE VISIT (OUTPATIENT)
Age: 55
End: 2025-04-22
Payer: COMMERCIAL

## 2025-04-22 DIAGNOSIS — G56.01 CARPAL TUNNEL SYNDROME OF RIGHT WRIST: Primary | ICD-10-CM

## 2025-04-22 DIAGNOSIS — G56.02 CARPAL TUNNEL SYNDROME OF LEFT WRIST: ICD-10-CM

## 2025-04-22 PROCEDURE — 99024 POSTOP FOLLOW-UP VISIT: CPT | Performed by: STUDENT IN AN ORGANIZED HEALTH CARE EDUCATION/TRAINING PROGRAM

## 2025-04-22 NOTE — PROGRESS NOTES
Clinic Note     Allergies[1]    Date of Surgery:   Left endoscopic CTR - 2/19/25  Right endoscopic CTR - 3/12/25    HPI:  This patient is status post the above surgeries. Overall doing excellent. He states the numbness and tingling in both hands that he was experiencing pre-operatively, is now completely resolved. He has no pain in either hand and is back to all of his regular activities without issue, including riding his motorcycle.     Overall extremely pleased with post-op progress thus far. Symptoms are much improved compared to pre-operative symptoms.    Review of Systems:  Negative unless stated in HPI.    Physical Exam:         Constitutional: NAD. AOx3. Well-developed and Well-nourished.   Psychiatric: Normal mood/ affect/ behavior. Judgment and thought content normal.     Left Upper Extremity:     Inspection    Surgical wounds is well healed without signs of infection. No signs of wound dehiscence.    Palpation    Non-tender to palpation over surgical wound.      ROM    ROM of the wrist is full and painless.     Able to make a full fist and extend all fingers with ease.     Neurovascular    Normal sensation in the median, ulnar, and radial nerve distribution. Normal motor function of muscles innervated by median/AIN, ulnar, and radial/PIN nerves.    Normally perfused hand(s).     2-point sensation intact to 5mm in all digits with exception of thumb and index finger, where 2-point sensation is intact to 7mm; improved from pre-operative exam.          Right Upper Extremity:     Inspection    Surgical wound is well healed without signs of infection. No signs of wound dehiscence.    Palpation    Non-tender to palpation over surgical wound.      ROM    ROM of the wrist is full and painless.     Able to make a full fist and extend all fingers with ease.     Neurovascular    Normal sensation in the median, ulnar, and radial nerve distribution. Normal motor function of muscles innervated by median/AIN, ulnar,  and radial/PIN nerves.    Normally perfused hand(s).    2-point sensation intact to 5mm in all digits, improved from pre-operative exam.       Assessment/Plan:  54 year old male status post the above surgeries.    I reviewed the patient's electronic medical record, including the pertinent office notes, medical/surgical history, medications and images. I specifically reviewed the images noted above, independently and discussed my independent interpretation of these images in combination with the pertinent positive and negative findings in my clinical exam with the patient    1. Status post left endoscopic carpal tunnel release on 2/19/25, and right endoscopic carpal tunnel release on 3/12/25.    Pepe is doing excellent and overall very pleased with his post-operative outcome thus far and can resume activities as tolerated with respect to both extremities. The expected post-operative course was discussed at length again today. All questions answered.     Follow Up: as needed    Uvaldo Velásquez MD   Hand, Wrist, & Elbow Surgery  [email or web address]         [1] No Known Allergies

## 2025-05-16 RX ORDER — LANCETS 28 GAUGE
EACH MISCELLANEOUS DAILY
Qty: 100 EACH | Refills: 2 | Status: SHIPPED | OUTPATIENT
Start: 2025-05-16

## 2025-05-20 RX ORDER — BLOOD-GLUCOSE METER
1 KIT MISCELLANEOUS DAILY
Qty: 100 STRIP | Refills: 0 | Status: SHIPPED | OUTPATIENT
Start: 2025-05-20

## 2025-06-14 ENCOUNTER — LAB ENCOUNTER (OUTPATIENT)
Dept: LAB | Age: 55
End: 2025-06-14
Attending: FAMILY MEDICINE
Payer: COMMERCIAL

## 2025-06-14 DIAGNOSIS — E11.65 TYPE 2 DIABETES MELLITUS WITH HYPERGLYCEMIA, WITHOUT LONG-TERM CURRENT USE OF INSULIN (HCC): ICD-10-CM

## 2025-06-14 LAB
CREAT UR-SCNC: 244.7 MG/DL
EST. AVERAGE GLUCOSE BLD GHB EST-MCNC: 117 MG/DL (ref 68–126)
HBA1C MFR BLD: 5.7 % (ref ?–5.7)
MICROALBUMIN UR-MCNC: 3.9 MG/DL
MICROALBUMIN/CREAT 24H UR-RTO: 15.9 UG/MG (ref ?–30)

## 2025-06-14 PROCEDURE — 82043 UR ALBUMIN QUANTITATIVE: CPT

## 2025-06-14 PROCEDURE — 36415 COLL VENOUS BLD VENIPUNCTURE: CPT

## 2025-06-14 PROCEDURE — 82570 ASSAY OF URINE CREATININE: CPT

## 2025-06-14 PROCEDURE — 83036 HEMOGLOBIN GLYCOSYLATED A1C: CPT

## 2025-06-18 ENCOUNTER — OFFICE VISIT (OUTPATIENT)
Dept: FAMILY MEDICINE CLINIC | Facility: CLINIC | Age: 55
End: 2025-06-18
Payer: COMMERCIAL

## 2025-06-18 VITALS
HEIGHT: 67 IN | HEART RATE: 80 BPM | DIASTOLIC BLOOD PRESSURE: 88 MMHG | WEIGHT: 200 LBS | OXYGEN SATURATION: 98 % | BODY MASS INDEX: 31.39 KG/M2 | RESPIRATION RATE: 15 BRPM | SYSTOLIC BLOOD PRESSURE: 138 MMHG

## 2025-06-18 DIAGNOSIS — E78.5 HYPERLIPIDEMIA, UNSPECIFIED HYPERLIPIDEMIA TYPE: ICD-10-CM

## 2025-06-18 DIAGNOSIS — E11.65 TYPE 2 DIABETES MELLITUS WITH HYPERGLYCEMIA, WITHOUT LONG-TERM CURRENT USE OF INSULIN (HCC): Primary | ICD-10-CM

## 2025-06-18 PROCEDURE — 3061F NEG MICROALBUMINURIA REV: CPT | Performed by: FAMILY MEDICINE

## 2025-06-18 PROCEDURE — 99214 OFFICE O/P EST MOD 30 MIN: CPT | Performed by: FAMILY MEDICINE

## 2025-06-18 PROCEDURE — 3075F SYST BP GE 130 - 139MM HG: CPT | Performed by: FAMILY MEDICINE

## 2025-06-18 PROCEDURE — 3044F HG A1C LEVEL LT 7.0%: CPT | Performed by: FAMILY MEDICINE

## 2025-06-18 PROCEDURE — 3008F BODY MASS INDEX DOCD: CPT | Performed by: FAMILY MEDICINE

## 2025-06-18 PROCEDURE — 3079F DIAST BP 80-89 MM HG: CPT | Performed by: FAMILY MEDICINE

## 2025-06-18 RX ORDER — ATORVASTATIN CALCIUM 20 MG/1
20 TABLET, FILM COATED ORAL NIGHTLY
Qty: 90 TABLET | Refills: 1 | Status: SHIPPED | OUTPATIENT
Start: 2025-06-18

## 2025-06-18 NOTE — PROGRESS NOTES
[facility] Progress Note    SUBJECTIVE: Pepe Olson Jr. 54 year old male is here today for   Chief Complaint   Patient presents with    Diabetes     Follow up-had labs done        Follow up on diabetes.    Labs ar great.    Primary lifestyle change has been cutting back on eats.    The 10-year ASCVD risk score (Zbigniew DAVILA, et al., 2019) is: 10.5%    Values used to calculate the score:      Age: 54 years      Sex: Male      Is Non- : No      Diabetic: Yes      Tobacco smoker: No      Systolic Blood Pressure: 138 mmHg      Is BP treated: No      HDL Cholesterol: 57 mg/dL      Total Cholesterol: 214 mg/dL      PMH  Past Medical History[1]     PSH  Past Surgical History[2]     Social Hx:  No changes.    ROS  See HPI    OBJECTIVE:  /88   Pulse 80   Resp 15   Ht 5' 7\" (1.702 m)   Wt 200 lb (90.7 kg)   SpO2 98%   BMI 31.32 kg/m²         Labs:          Meds:   Current Medications[3]      Assessment/Plan  Pepe was seen today for diabetes.    Diagnoses and all orders for this visit:    Type 2 diabetes mellitus with hyperglycemia, without long-term current use of insulin (HCC)    Hyperlipidemia, unspecified hyperlipidemia type  -     atorvastatin 20 MG Oral Tab; Take 1 tablet (20 mg total) by mouth nightly.  -     Comp Metabolic Panel (14) [E]; Future  -     Lipid Panel [E]; Future         Will start statin, and recheck labs in 3 months, A1c in 6 months     Diabetes well controlled, discussed possible side effects of atorvastatin    Total Time spent with patient and coordinating care:  15 minutes.    Follow up: as needed      Jose L Fam MD         [1]   Past Medical History:   Arthritis    Diabetes (HCC)    Osteoarthritis    Unknown    Plantar fasciitis   [2]   Past Surgical History:  Procedure Laterality Date    Appendectomy      Carpal tunnel release      Other surgical history  20+ yrs    Appendectomy    Vasectomy  ?   [3]   Current Outpatient Medications   Medication  Sig Dispense Refill    atorvastatin 20 MG Oral Tab Take 1 tablet (20 mg total) by mouth nightly. 90 tablet 1    FREESTYLE LITE TEST In Vitro Strip USE TO TEST BLOOD SUGAR DAILY AS DIRECTED 100 strip 0    FreeStyle Lancets Does not apply Misc USE TO TEST DAILY AS DIRECTED 100 each 2    metFORMIN  MG Oral Tablet 24 Hr Take 2 tablets (1,000 mg total) by mouth 2 (two) times daily with meals. 360 tablet 1    Sildenafil Citrate 20 MG Oral Tab

## 2025-08-12 DIAGNOSIS — E11.65 TYPE 2 DIABETES MELLITUS WITH HYPERGLYCEMIA, WITHOUT LONG-TERM CURRENT USE OF INSULIN (HCC): ICD-10-CM

## 2025-08-12 RX ORDER — METFORMIN HYDROCHLORIDE 500 MG/1
1000 TABLET, EXTENDED RELEASE ORAL 2 TIMES DAILY WITH MEALS
Qty: 360 TABLET | Refills: 1 | Status: SHIPPED | OUTPATIENT
Start: 2025-08-12

## (undated) NOTE — LETTER
Date: 3/25/2025    Patient Name: Pepe Olson           To Whom it may concern:     The above patient was seen at [facility] for treatment of a medical condition.    This patient should be excused from attending work until April 7th, 2025 and then okay to return after that date.        Sincerely,    MD Uvaldo Aguiar MD   Hand, Wrist, & Elbow Surgery  [email or web address]

## (undated) NOTE — ED AVS SNAPSHOT
Laurie Mcgee   MRN: [id number]    Department:  THE [facility] Emergency Department in [place]   Date of Visit:  4/1/2018           Disclosure     Insurance plans vary and the physician(s) referred by the ER may not be covered by your plan.  Please contact tell this physician (or your personal doctor if your instructions are to return to your personal doctor) about any new or lasting problems. The primary care or specialist physician will see patients referred from the [facility] Emergency Department.  Javier Griffin

## (undated) NOTE — MR AVS SNAPSHOT
[address]  [address] [id number] [phone]               Thank you for choosing us for your health care visit with Nick Strong MD.  We are glad to serve you and happy to provide you with this Educational Information     Healthy Diet and Regular Exercise  [facility] [address] for making healthy food choices  -   Enjoy your food, but eat less. Fully enjoy your food when eating. Don’t eat while distracted and slow down.    Avoid

## (undated) NOTE — LETTER
March 21, 2017    Patient: Thomas Javed   Date of Visit: 3/20/2017       To Whom It May Concern:    Sharonda Contreras was seen and treated in our emergency department on 3/20/2017. He {Return to school/sport/work:[id number]}.     If you have any questions

## (undated) NOTE — LETTER
March 21, 2017    Patient: Brandan Nolasco   Date of Visit: 3/20/2017       To Whom It May Concern:    Valorie Soler was seen and treated in our emergency department on 3/20/2017. He can return to work March 23, 2017  .     If you have any questions or co

## (undated) NOTE — LETTER
To Whom It May Concern:    Sabine Ivy. is currently under my medical care and may return to work at this time. Activity is restricted as follows: none. If you require additional information please contact our office.     Sincerely,      Bharat Scott

## (undated) NOTE — ED AVS SNAPSHOT
THE [facility] Emergency Department in [address]    Phone:  [phone]    Fax:  [phone] [facility]   MRN: [id number]    Department:  THE [facility] Emergency Department in [place]   Date of Visit: IF THERE IS ANY CHANGE OR WORSENING OF YOUR CONDITION, CALL YOUR PRIMARY CARE PHYSICIAN AT ONCE OR RETURN IMMEDIATELY TO THE EMERGENCY DEPARTMENT.     If you have been prescribed any medication(s), please fill your prescription right away and begin taking t

## (undated) NOTE — LETTER
Date: 3/2/2021    Patient Name: Lewis Molina. To Whom it may concern: This letter has been written at the patient's request. The above patient was seen at the [facility] for treatment of a medical condition.     This patient s

## (undated) NOTE — ED AVS SNAPSHOT
THE [facility] Emergency Department in [address] [facility] [address]    Phone:  [phone]    Fax:  [phone] [facility]   MRN: [id number]    Department:  THE [facility] Emergency Department in [place]   Date of Visit: BRONCHITIS, ANTIOBIOTIC TREATMENT (ADULT) (ENGLISH)      Disclosure     Insurance plans vary and the physician(s) referred by the ER may not be covered by your plan.  Please contact your insurance company to determine coverage for follow-up care and referr prescription right away and begin taking the medication(s) as directed    If the emergency physician has read X-rays, these will be re-interpreted by a radiologist.  If there is a significant change in your reading, you will be contacted.  Please make sure Medicaid plans. To get signed up and covered, please call [phone] and ask to get set up for an insurance coverage that is in-network with [facility].         Imaging Results         XR CHEST PA + LAT CHEST (CPT=71020) (Final result) Resu